# Patient Record
Sex: FEMALE | Race: WHITE | NOT HISPANIC OR LATINO | Employment: OTHER | URBAN - METROPOLITAN AREA
[De-identification: names, ages, dates, MRNs, and addresses within clinical notes are randomized per-mention and may not be internally consistent; named-entity substitution may affect disease eponyms.]

---

## 2017-05-22 ENCOUNTER — TELEPHONE (OUTPATIENT)
Dept: UROLOGY | Facility: CLINIC | Age: 67
End: 2017-05-22

## 2017-05-22 RX ORDER — SULFAMETHOXAZOLE AND TRIMETHOPRIM 800; 160 MG/1; MG/1
1 TABLET ORAL 2 TIMES DAILY
Qty: 14 TABLET | Refills: 0 | Status: SHIPPED | OUTPATIENT
Start: 2017-05-22 | End: 2017-05-29

## 2017-05-22 NOTE — TELEPHONE ENCOUNTER
Pt states will be going out of town an would like to know if  would consider calling in an rx for antibiotics just in case she gets a uti while out of town.  Pt states will not take unless she gets uti and she would like to know if okay to increase elmiron an urbel if she gets a ic flare up. Pt states taking elmiron twice daily an urbel 4 times daily. Please advise./ofelia

## 2017-05-22 NOTE — TELEPHONE ENCOUNTER
Okay to take Elmiron up to TID. Uribel up to QID.     Bactrim sent to pharmacy to take ONLY if UTI occurs while traveling.

## 2017-05-22 NOTE — TELEPHONE ENCOUNTER
----- Message from Lashae Velasquez sent at 5/22/2017 11:23 AM CDT -----  Contact: pt  _  1st Request  _  2nd Request  _  3rd Request        Who: pt    Why: pt is asking for emergency prescription for antibiotics. Has other questions regarding her prescriptions. Please call    What Number to Call Back:[phone]    When to Expect a call back: (Before the end of the day)   -- if the call is after 12:00, the call back will be tomorrow.

## 2017-06-22 ENCOUNTER — TELEPHONE (OUTPATIENT)
Dept: UROLOGY | Facility: CLINIC | Age: 67
End: 2017-06-22

## 2017-06-22 NOTE — TELEPHONE ENCOUNTER
----- Message from Ludivina Alonso sent at 6/22/2017 12:13 PM CDT -----  Contact: self  Pt calling to state that medication of Arturo lopez is not covered thru insurance and cost 1,004. Please call pt with the substitution at [phone].

## 2017-06-27 ENCOUNTER — TELEPHONE (OUTPATIENT)
Dept: UROLOGY | Facility: CLINIC | Age: 67
End: 2017-06-27

## 2017-06-27 NOTE — TELEPHONE ENCOUNTER
Can she contact her insurance provider to see what they cover that is similar to Uribel or Urogesic blue? I can pick from what they cover.

## 2017-06-27 NOTE — TELEPHONE ENCOUNTER
Please contact the Uribel rep that calls on our office to check with them to see if there any alternatives. I don't have any other solutions from my standpoint. The voucher is supposed to cover 40tabs for $20, I am not sure if there is away to work with that either.

## 2017-06-27 NOTE — TELEPHONE ENCOUNTER
I did speak with Ms. Robles and she did ask them to check to see what they did cover- The insurance does not cover anything that can be listed as an alternative. If no suggestions or advise, she states she will go ahead and get the medication.

## 2017-06-27 NOTE — TELEPHONE ENCOUNTER
Spoke with nellie at [facility]- The reason why it is not going through is how the script is written- the coupon will go through if the script is written for 40 pills with refills- the coupon can be used as many time as she wants. We would have to rewrite the rx.

## 2017-06-27 NOTE — TELEPHONE ENCOUNTER
I have tried to PAs for Uribel and called in a coupon to the pharmacy- everything is coming up with a 1K co-pay- Is there anything else we can change this medication to for the patient? Please advise.

## 2017-11-22 ENCOUNTER — OFFICE VISIT (OUTPATIENT)
Dept: UROLOGY | Facility: CLINIC | Age: 67
End: 2017-11-22
Attending: UROLOGY
Payer: MEDICARE

## 2017-11-22 VITALS
HEART RATE: 50 BPM | BODY MASS INDEX: 21.84 KG/M2 | SYSTOLIC BLOOD PRESSURE: 126 MMHG | DIASTOLIC BLOOD PRESSURE: 70 MMHG | WEIGHT: 118.69 LBS | HEIGHT: 62 IN

## 2017-11-22 DIAGNOSIS — R33.9 INCOMPLETE BLADDER EMPTYING: ICD-10-CM

## 2017-11-22 DIAGNOSIS — N30.10 INTERSTITIAL CYSTITIS: Primary | ICD-10-CM

## 2017-11-22 LAB
BILIRUB SERPL-MCNC: NORMAL MG/DL
BLOOD URINE, POC: NORMAL
COLOR, POC UA: YELLOW
GLUCOSE UR QL STRIP: NORMAL
KETONES UR QL STRIP: NORMAL
LEUKOCYTE ESTERASE URINE, POC: NORMAL
NITRITE, POC UA: NORMAL
PH, POC UA: 8
POC RESIDUAL URINE VOLUME: 292 ML (ref 0–100)
PROTEIN, POC: NORMAL
SPECIFIC GRAVITY, POC UA: 1.01
UROBILINOGEN, POC UA: NORMAL

## 2017-11-22 PROCEDURE — 51798 US URINE CAPACITY MEASURE: CPT | Mod: S$GLB,,, | Performed by: UROLOGY

## 2017-11-22 PROCEDURE — 81002 URINALYSIS NONAUTO W/O SCOPE: CPT | Mod: S$GLB,,, | Performed by: UROLOGY

## 2017-11-22 PROCEDURE — 99214 OFFICE O/P EST MOD 30 MIN: CPT | Mod: 25,S$GLB,, | Performed by: UROLOGY

## 2017-11-22 NOTE — PROGRESS NOTES
"  Subjective:       Kim Robles is a 67 y.o. female who is an established pt who was seen for evaluation of IC.      Previous pt of Dr Ramires for over 10 years. Diagnosed with IC. Stable on Elmiron and Urogesic blue/Uribel. Never underwent any procedures or instillations. Doing well on medications.     She reports several UTIs that started around time of a vacation (when holding her urine while traveling). She was given PRN Bactrim for traveling - has not needed it.     She has been doing well since last visit. Insurance issues with coverage of Uribel - doing better now.    PVR (bladder scan) today - 292cc. She does do Crede occasionally and is already double voiding.         The following portions of the patient's history were reviewed and updated as appropriate: allergies, current medications, past family history, past medical history, past social history, past surgical history and problem list.    Review of Systems  Constitutional: no fever or chills  ENT: no nasal congestion or sore throat  Respiratory: no cough or shortness of breath  Cardiovascular: no chest pain or palpitations  Gastrointestinal: no nausea or vomiting, tolerating diet  Genitourinary: as per HPI  Hematologic/Lymphatic: no easy bruising or lymphadenopathy  Musculoskeletal: no arthralgias or myalgias  Skin: no rashes or lesions  Neurological: no seizures or tremors  Behavioral/Psych: no auditory or visual hallucinations       Objective:    Vitals:   /70 (BP Location: Left arm, Patient Position: Sitting, BP Method: Large (Automatic))   Pulse (!) 50   Ht 5' 2" (1.575 m)   Wt 53.8 kg (118 lb 11.5 oz)   BMI 21.71 kg/m²     Physical Exam   General: well developed, well nourished in no acute distress  Head: normocephalic, atraumatic  Neck: supple, trachea midline, no obvious enlargement of thyroid  HEENT: EOMI, mucus membranes moist, sclera anicteric, no hearing impairment  Lungs: symmetric expansion, non-labored " breathing  Cardiovascular: regular rate and rhythm, normal pulses  Abdomen: soft, non tender, non distended, no palpable masses, no hepatosplenomegaly, no hernias, no CVA tenderness  Musculoskeletal: no peripheral edema, normal ROM in bilateral upper and lower extremities  Lymphatics: no cervical or inguinal lymphadenopathy  Skin: no rashes or lesions  Neuro: alert and oriented x 3, no gross deficits  Psych: normal judgment and insight, normal mood/affect and non-anxious  Genitourinary:   patient declined exam      Lab Review   Urine analysis today in clinic shows 5-10 RBCs    No results found for: WBC, HGB, HCT, MCV, PLT  Lab Results   Component Value Date    CREATININE 0.70 02/18/2014    BUN 10 02/18/2014       Imaging  NA       Assessment/Plan:      1. Interstitial cystitis    - Stable on Uribel and Elmiron   - Refills given today for 3 month supply x 1 year     2. ABDON   - Double voiding   - Will continue to monitor      Follow up in 12 months with PVR

## 2018-05-14 ENCOUNTER — OFFICE VISIT (OUTPATIENT)
Dept: UROLOGY | Facility: CLINIC | Age: 68
End: 2018-05-14
Payer: MEDICARE

## 2018-05-14 VITALS
BODY MASS INDEX: 22.31 KG/M2 | RESPIRATION RATE: 14 BRPM | WEIGHT: 121.25 LBS | DIASTOLIC BLOOD PRESSURE: 70 MMHG | HEIGHT: 62 IN | HEART RATE: 68 BPM | SYSTOLIC BLOOD PRESSURE: 122 MMHG

## 2018-05-14 DIAGNOSIS — N30.10 INTERSTITIAL CYSTITIS: ICD-10-CM

## 2018-05-14 DIAGNOSIS — R33.9 INCOMPLETE BLADDER EMPTYING: Primary | ICD-10-CM

## 2018-05-14 PROCEDURE — 99999 PR PBB SHADOW E&M-EST. PATIENT-LVL III: CPT | Mod: PBBFAC,,, | Performed by: UROLOGY

## 2018-05-14 PROCEDURE — 99213 OFFICE O/P EST LOW 20 MIN: CPT | Mod: PBBFAC | Performed by: UROLOGY

## 2018-05-14 PROCEDURE — 99214 OFFICE O/P EST MOD 30 MIN: CPT | Mod: S$PBB,,, | Performed by: UROLOGY

## 2018-05-14 RX ORDER — LEVOTHYROXINE SODIUM 25 UG/1
TABLET ORAL
Refills: 7 | COMMUNITY
Start: 2018-04-27 | End: 2018-06-18

## 2018-05-14 RX ORDER — SULFAMETHOXAZOLE AND TRIMETHOPRIM 800; 160 MG/1; MG/1
1 TABLET ORAL 2 TIMES DAILY
Qty: 14 TABLET | Refills: 1 | Status: SHIPPED | OUTPATIENT
Start: 2018-05-14 | End: 2018-05-21

## 2018-05-14 NOTE — PROGRESS NOTES
"  Subjective:       Kim Robles is a 68 y.o. female who is an established pt who was seen for evaluation of IC.      Previous pt of Dr Ramires for over 10 years. Diagnosed with IC. Stable on Elmiron and Urogesic blue/Uribel. Never underwent any procedures or instillations. Doing well on medications.     She reports several UTIs that started around time of a vacation (when holding her urine while traveling). She was given PRN Bactrim for traveling - has not needed it.     Insurance issues with coverage of Uribel - doing better now.    PVR (bladder scan) last visit - 292cc. She does do Crede occasionally and is already double voiding.     She now returns with more urgency and sensation of ABDON. She took Bactrim in 4/18 due to concern for UTI (vs flare) around time of vacation (holds urine). She is noting more dietary and stress triggers. More constipation recently. Nocturia x 3.    PVR (bladder scan) today - >400cc      The following portions of the patient's history were reviewed and updated as appropriate: allergies, current medications, past family history, past medical history, past social history, past surgical history and problem list.    Review of Systems  Constitutional: no fever or chills  ENT: no nasal congestion or sore throat  Respiratory: no cough or shortness of breath  Cardiovascular: no chest pain or palpitations  Gastrointestinal: no nausea or vomiting, tolerating diet  Genitourinary: as per HPI  Hematologic/Lymphatic: no easy bruising or lymphadenopathy  Musculoskeletal: no arthralgias or myalgias  Skin: no rashes or lesions  Neurological: no seizures or tremors  Behavioral/Psych: no auditory or visual hallucinations       Objective:    Vitals:   /70   Pulse 68   Resp 14   Ht 5' 2" (1.575 m)   Wt 55 kg (121 lb 4.1 oz)   BMI 22.18 kg/m²     Physical Exam   General: well developed, well nourished in no acute distress  Head: normocephalic, atraumatic  Neck: supple, trachea midline, no " obvious enlargement of thyroid  HEENT: EOMI, mucus membranes moist, sclera anicteric, no hearing impairment  Lungs: symmetric expansion, non-labored breathing  Cardiovascular: regular rate and rhythm, normal pulses  Abdomen: soft, non tender, non distended, no palpable masses, no hepatosplenomegaly, no hernias, no CVA tenderness  Musculoskeletal: no peripheral edema, normal ROM in bilateral upper and lower extremities  Lymphatics: no cervical or inguinal lymphadenopathy  Skin: no rashes or lesions  Neuro: alert and oriented x 3, no gross deficits  Psych: normal judgment and insight, normal mood/affect and non-anxious  Genitourinary:   patient declined exam      Lab Review   Urine analysis today in clinic shows - negative    No results found for: WBC, HGB, HCT, MCV, PLT  Lab Results   Component Value Date    CREATININE 0.70 02/18/2014    BUN 10 02/18/2014       Imaging  NA       Assessment/Plan:      1. Interstitial cystitis    - Stable on Uribel and Elmiron       2. ABDON   - Double voiding   - PVR even higher now   - ?Flomax ?Urecholine   - Miralax now for constipation   - Urodynamics/cystoscopy   - ?CIC - okay to hold   - ?InterStim    3. Recurrent UTI   - Bactrim rx for self-start PRN   - UCx with UTI symptoms      Follow up in 2 months with PVR

## 2018-06-28 ENCOUNTER — TELEPHONE (OUTPATIENT)
Dept: UROLOGY | Facility: CLINIC | Age: 68
End: 2018-06-28

## 2018-08-27 ENCOUNTER — OFFICE VISIT (OUTPATIENT)
Dept: UROLOGY | Facility: CLINIC | Age: 68
End: 2018-08-27
Payer: MEDICARE

## 2018-08-27 VITALS
HEART RATE: 68 BPM | BODY MASS INDEX: 22.36 KG/M2 | SYSTOLIC BLOOD PRESSURE: 112 MMHG | WEIGHT: 121.5 LBS | HEIGHT: 62 IN | DIASTOLIC BLOOD PRESSURE: 70 MMHG | RESPIRATION RATE: 14 BRPM

## 2018-08-27 DIAGNOSIS — R33.9 INCOMPLETE BLADDER EMPTYING: Primary | ICD-10-CM

## 2018-08-27 DIAGNOSIS — N39.0 RECURRENT UTI: ICD-10-CM

## 2018-08-27 DIAGNOSIS — N30.10 INTERSTITIAL CYSTITIS: ICD-10-CM

## 2018-08-27 PROCEDURE — 99213 OFFICE O/P EST LOW 20 MIN: CPT | Mod: PBBFAC | Performed by: UROLOGY

## 2018-08-27 PROCEDURE — 99999 PR PBB SHADOW E&M-EST. PATIENT-LVL III: CPT | Mod: PBBFAC,,, | Performed by: UROLOGY

## 2018-08-27 PROCEDURE — 99214 OFFICE O/P EST MOD 30 MIN: CPT | Mod: S$PBB,,, | Performed by: UROLOGY

## 2018-08-27 RX ORDER — TETANUS TOXOID, REDUCED DIPHTHERIA TOXOID AND ACELLULAR PERTUSSIS VACCINE, ADSORBED 5; 2.5; 8; 8; 2.5 [IU]/.5ML; [IU]/.5ML; UG/.5ML; UG/.5ML; UG/.5ML
SUSPENSION INTRAMUSCULAR
COMMUNITY
Start: 2018-07-20

## 2018-08-27 NOTE — PROGRESS NOTES
"  Subjective:       Kim Robles is a 68 y.o. female who is an established pt who was seen for evaluation of IC.      Previous pt of Dr Ramires for over 10 years. Diagnosed with IC. Stable on Elmiron and Urogesic blue/Uribel. Never underwent any procedures or instillations. Doing well on medications.     She reports several UTIs that started around time of a vacation (when holding her urine while traveling). She was given PRN Bactrim for traveling - has not needed it.     Insurance issues with coverage of Uribel - doing better now.    PVR (bladder scan) last visit - 292cc. She does do Crede occasionally and is already double voiding.     She returned with more urgency and sensation of ABDON. She took Bactrim in 4/18 due to concern for UTI (vs flare) around time of vacation (holds urine). She is noting more dietary and stress triggers. More constipation recently - improved with Miralax. Nocturia x 3.    She is feeling better than previously.     PVR (bladder scan) last visit - >400cc, today - 252cc      The following portions of the patient's history were reviewed and updated as appropriate: allergies, current medications, past family history, past medical history, past social history, past surgical history and problem list.    Review of Systems  Constitutional: no fever or chills  ENT: no nasal congestion or sore throat  Respiratory: no cough or shortness of breath  Cardiovascular: no chest pain or palpitations  Gastrointestinal: no nausea or vomiting, tolerating diet  Genitourinary: as per HPI  Hematologic/Lymphatic: no easy bruising or lymphadenopathy  Musculoskeletal: no arthralgias or myalgias  Skin: no rashes or lesions  Neurological: no seizures or tremors  Behavioral/Psych: no auditory or visual hallucinations       Objective:    Vitals:   /70   Pulse 68   Resp 14   Ht 5' 2" (1.575 m)   Wt 55.1 kg (121 lb 7.6 oz)   BMI 22.22 kg/m²     Physical Exam   General: well developed, well nourished in " no acute distress  Head: normocephalic, atraumatic  Neck: supple, trachea midline, no obvious enlargement of thyroid  HEENT: EOMI, mucus membranes moist, sclera anicteric, no hearing impairment  Lungs: symmetric expansion, non-labored breathing  Cardiovascular: regular rate and rhythm, normal pulses  Abdomen: soft, non tender, non distended, no palpable masses, no hepatosplenomegaly, no hernias, no CVA tenderness  Musculoskeletal: no peripheral edema, normal ROM in bilateral upper and lower extremities  Lymphatics: no cervical or inguinal lymphadenopathy  Skin: no rashes or lesions  Neuro: alert and oriented x 3, no gross deficits  Psych: normal judgment and insight, normal mood/affect and non-anxious  Genitourinary:   patient declined exam      Lab Review   Urine analysis today in clinic shows - negative    No results found for: WBC, HGB, HCT, MCV, PLT  Lab Results   Component Value Date    CREATININE 0.70 02/18/2014    BUN 10 02/18/2014       Imaging  NA       Assessment/Plan:      1. Interstitial cystitis    - Stable on Uribel and Elmiron       2. ABDON   - Double voiding   - PVR even higher now   - ?Flomax ?Urecholine   - Miralax now for constipation   - Urodynamics/cystoscopy   - ?CIC - okay to hold   - ?InterStim   - Improving    3. Recurrent UTI   - Bactrim rx for self-start PRN   - UCx with UTI symptoms   - No issues currently      Follow up in 6 months with PVR

## 2018-12-11 RX ORDER — PENTOSAN POLYSULFATE SODIUM 100 MG/1
CAPSULE, GELATIN COATED ORAL
Qty: 180 CAPSULE | Refills: 3 | Status: SHIPPED | OUTPATIENT
Start: 2018-12-11 | End: 2019-11-14 | Stop reason: SDUPTHER

## 2019-02-28 ENCOUNTER — OFFICE VISIT (OUTPATIENT)
Dept: UROLOGY | Facility: CLINIC | Age: 69
End: 2019-02-28
Payer: MEDICARE

## 2019-02-28 VITALS
HEIGHT: 62 IN | SYSTOLIC BLOOD PRESSURE: 112 MMHG | WEIGHT: 124.31 LBS | RESPIRATION RATE: 14 BRPM | DIASTOLIC BLOOD PRESSURE: 58 MMHG | BODY MASS INDEX: 22.88 KG/M2 | HEART RATE: 60 BPM

## 2019-02-28 DIAGNOSIS — R33.9 INCOMPLETE BLADDER EMPTYING: ICD-10-CM

## 2019-02-28 DIAGNOSIS — N39.0 RECURRENT UTI: ICD-10-CM

## 2019-02-28 DIAGNOSIS — N30.10 INTERSTITIAL CYSTITIS: Primary | ICD-10-CM

## 2019-02-28 PROCEDURE — 99999 PR PBB SHADOW E&M-EST. PATIENT-LVL III: CPT | Mod: PBBFAC,,, | Performed by: UROLOGY

## 2019-02-28 PROCEDURE — 99213 OFFICE O/P EST LOW 20 MIN: CPT | Mod: PBBFAC | Performed by: UROLOGY

## 2019-02-28 PROCEDURE — 99214 OFFICE O/P EST MOD 30 MIN: CPT | Mod: S$PBB,,, | Performed by: UROLOGY

## 2019-02-28 PROCEDURE — 99214 PR OFFICE/OUTPT VISIT, EST, LEVL IV, 30-39 MIN: ICD-10-PCS | Mod: S$PBB,,, | Performed by: UROLOGY

## 2019-02-28 PROCEDURE — 99999 PR PBB SHADOW E&M-EST. PATIENT-LVL III: ICD-10-PCS | Mod: PBBFAC,,, | Performed by: UROLOGY

## 2019-02-28 RX ORDER — OMEGA-3 FATTY ACIDS 1000 MG
2 CAPSULE ORAL
COMMUNITY

## 2019-02-28 RX ORDER — URINARY ANTISEPTIC ANTISPASMODIC 81.6; 40.8; 10.8; .12 MG/1; MG/1; MG/1; MG/1
1 TABLET ORAL
COMMUNITY
End: 2019-02-28 | Stop reason: CLARIF

## 2019-02-28 NOTE — PROGRESS NOTES
"  Subjective:       iKm Robles is a 68 y.o. female who is an established pt who was seen for evaluation of IC.      Previous pt of Dr Ramires for over 10 years. Diagnosed with IC. Stable on Elmiron and Urogesic blue/Uribel. Never underwent any procedures or instillations. Doing well on medications.     She reports several UTIs that started around time of a vacation (when holding her urine while traveling). She was given PRN Bactrim for traveling - has not needed it.     Insurance issues with coverage of Uribel - doing better now.    PVR (bladder scan) last visit - 292cc. She does do Crede occasionally and is already double voiding.     She returned with more urgency and sensation of ABDON. She took Bactrim in 4/18 due to concern for UTI (vs flare) around time of vacation (holds urine). She is noting more dietary and stress triggers. More constipation recently - improved with Miralax. Nocturia x 3.    She is feeling better than previously.     PVR (bladder scan) last visit - >400cc, 252cc, today - 221cc      The following portions of the patient's history were reviewed and updated as appropriate: allergies, current medications, past family history, past medical history, past social history, past surgical history and problem list.    Review of Systems  Constitutional: no fever or chills  ENT: no nasal congestion or sore throat  Respiratory: no cough or shortness of breath  Cardiovascular: no chest pain or palpitations  Gastrointestinal: no nausea or vomiting, tolerating diet  Genitourinary: as per HPI  Hematologic/Lymphatic: no easy bruising or lymphadenopathy  Musculoskeletal: no arthralgias or myalgias  Skin: no rashes or lesions  Neurological: no seizures or tremors  Behavioral/Psych: no auditory or visual hallucinations       Objective:    Vitals:   BP (!) 112/58   Pulse 60   Resp 14   Ht 5' 2" (1.575 m)   Wt 56.4 kg (124 lb 5.4 oz)   BMI 22.74 kg/m²     Physical Exam   General: well developed, well " nourished in no acute distress  Head: normocephalic, atraumatic  Neck: supple, trachea midline, no obvious enlargement of thyroid  HEENT: EOMI, mucus membranes moist, sclera anicteric, no hearing impairment  Lungs: symmetric expansion, non-labored breathing  Cardiovascular: regular rate and rhythm, normal pulses  Abdomen: soft, non tender, non distended, no palpable masses, no hepatosplenomegaly, no hernias, no CVA tenderness  Musculoskeletal: no peripheral edema, normal ROM in bilateral upper and lower extremities  Lymphatics: no cervical or inguinal lymphadenopathy  Skin: no rashes or lesions  Neuro: alert and oriented x 3, no gross deficits  Psych: normal judgment and insight, normal mood/affect and non-anxious  Genitourinary:   patient declined exam      Lab Review   Urine analysis today in clinic shows - negative    No results found for: WBC, HGB, HCT, MCV, PLT  Lab Results   Component Value Date    CREATININE 0.70 02/18/2014    BUN 10 02/18/2014       Imaging  NA       Assessment/Plan:      1. Interstitial cystitis    - Stable on Uribel and Elmiron       2. ABDON   - Double voiding   - PVR even higher now   - ?Flomax ?Urecholine   - Miralax now for constipation - doing well    - Urodynamics/cystoscopy   - ?CIC - okay to hold   - ?InterStim   - Improving    3. Recurrent UTI   - Bactrim rx for self-start PRN   - UCx with UTI symptoms   - No issues currently      Follow up in 12 months with PVR

## 2019-07-05 RX ORDER — METHENAMINE, SODIUM PHOSPHATE, MONOBASIC, MONOHYDRATE, PHENYL SALICYLATE, METHYLENE BLUE, AND HYOSCYAMINE SULFATE 118; 40.8; 36; 10; .12 MG/1; MG/1; MG/1; MG/1; MG/1
CAPSULE ORAL
Qty: 40 CAPSULE | Refills: 99 | Status: SHIPPED | OUTPATIENT
Start: 2019-07-05 | End: 2019-07-09 | Stop reason: SDUPTHER

## 2019-11-13 ENCOUNTER — TELEPHONE (OUTPATIENT)
Dept: UROLOGY | Facility: CLINIC | Age: 69
End: 2019-11-13

## 2019-11-13 NOTE — TELEPHONE ENCOUNTER
I am not sure how a physician can be out of network with a pharmacy. Do they need a new rx sent to [facility]? I don't quite understand the issue.

## 2019-11-13 NOTE — TELEPHONE ENCOUNTER
Pt.  is calling in reference to Uribel . Recently changed Pharmacy from [facility] to [facility] and was told that Dr. Cutler was out of network with them and was told they could not get the medication refilled ..  states he has never had an issue at [facility] with this before .. YUDI.

## 2019-11-14 ENCOUNTER — TELEPHONE (OUTPATIENT)
Dept: UROLOGY | Facility: CLINIC | Age: 69
End: 2019-11-14

## 2019-11-14 NOTE — TELEPHONE ENCOUNTER
----- Message from Eboni Ha MA sent at 11/14/2019  2:24 PM CST -----  Contact: Kim [phone]      ----- Message -----  From: Kimmy Marroquin  Sent: 11/14/2019  11:56 AM CST  To: [facility]    Type: RX Refill Request    Who Called: Kim     Refill or New Rx: refill     RX Name and Strength: ELMIRON 100 mg Cap    Is this a 30 day or 90 day RX:30 day     Preferred Pharmacy with phone number: [facility], [address]. [phone]    Would the patient rather a call back or a response via My [email or web address]? Call back     Best Call Back Number:[phone]    Additional Information: (#The patient stated that whe she tried to get the medication refilled, she was told that the medication is not covered under Medicare Part D. Patient stated that she did not understand & would like the staff to fax the medication over as soon as possible#)

## 2019-11-14 NOTE — TELEPHONE ENCOUNTER
----- Message from Kimmy Marroquin sent at 11/14/2019 11:56 AM CST -----  Contact: Kim [phone]  Type: RX Refill Request    Who Called: Kim     Refill or New Rx: refill     RX Name and Strength: ELMIRON 100 mg Cap    Is this a 30 day or 90 day RX:30 day     Preferred Pharmacy with phone number: [facility], [address]. [phone]    Would the patient rather a call back or a response via My [employer]? Call back     Best Call Back Number:[phone]    Additional Information: (#The patient stated that whe she tried to get the medication refilled, she was told that the medication is not covered under Medicare Part D. Patient stated that she did not understand & would like the staff to fax the medication over as soon as possible#)

## 2019-11-14 NOTE — TELEPHONE ENCOUNTER
Spoke to pt advised rx for elmiron was sent to [facility]. Pt states she fully understands.-ofelia

## 2020-07-16 ENCOUNTER — OFFICE VISIT (OUTPATIENT)
Dept: UROLOGY | Facility: CLINIC | Age: 70
End: 2020-07-16
Payer: MEDICARE

## 2020-07-16 VITALS — BODY MASS INDEX: 22.63 KG/M2 | HEIGHT: 62 IN | WEIGHT: 123 LBS

## 2020-07-16 DIAGNOSIS — N30.10 INTERSTITIAL CYSTITIS: Primary | ICD-10-CM

## 2020-07-16 DIAGNOSIS — R33.9 INCOMPLETE BLADDER EMPTYING: ICD-10-CM

## 2020-07-16 DIAGNOSIS — N39.0 RECURRENT UTI: ICD-10-CM

## 2020-07-16 PROCEDURE — 99999 PR PBB SHADOW E&M-EST. PATIENT-LVL IV: ICD-10-PCS | Mod: PBBFAC,,, | Performed by: UROLOGY

## 2020-07-16 PROCEDURE — 99214 OFFICE O/P EST MOD 30 MIN: CPT | Mod: PBBFAC | Performed by: UROLOGY

## 2020-07-16 PROCEDURE — 99213 OFFICE O/P EST LOW 20 MIN: CPT | Mod: S$PBB,,, | Performed by: UROLOGY

## 2020-07-16 PROCEDURE — 99213 PR OFFICE/OUTPT VISIT, EST, LEVL III, 20-29 MIN: ICD-10-PCS | Mod: S$PBB,,, | Performed by: UROLOGY

## 2020-07-16 PROCEDURE — 99999 PR PBB SHADOW E&M-EST. PATIENT-LVL IV: CPT | Mod: PBBFAC,,, | Performed by: UROLOGY

## 2020-07-16 RX ORDER — METHENAMINE, SODIUM PHOSPHATE, MONOBASIC, MONOHYDRATE, PHENYL SALICYLATE, METHYLENE BLUE, AND HYOSCYAMINE SULFATE 118; 40.8; 36; 10; .12 MG/1; MG/1; MG/1; MG/1; MG/1
1 CAPSULE ORAL 3 TIMES DAILY PRN
Qty: 90 CAPSULE | Refills: 99 | Status: SHIPPED | OUTPATIENT
Start: 2020-07-16 | End: 2020-07-27

## 2020-07-16 RX ORDER — PENTOSAN POLYSULFATE SODIUM 100 MG/1
100 CAPSULE, GELATIN COATED ORAL 2 TIMES DAILY
Qty: 180 CAPSULE | Refills: 3 | Status: SHIPPED | OUTPATIENT
Start: 2020-07-16 | End: 2021-08-04

## 2020-07-16 RX ORDER — CELECOXIB 200 MG/1
200 CAPSULE ORAL
COMMUNITY
End: 2024-02-26

## 2020-07-16 NOTE — PROGRESS NOTES
Subjective:       Kim Robles is a 70 y.o. female who is an established pt who was seen for evaluation of IC.      Previous pt of Dr Ramires for over 10 years. Diagnosed with IC. Stable on Elmiron and Urogesic blue/Uribel. Never underwent any procedures or instillations. Doing well on medications.     She reports several UTIs that started around time of a vacation (when holding her urine while traveling). She was given PRN Bactrim for traveling - has not needed it.     Insurance issues with coverage of Uribel - doing better now.    PVR (bladder scan) last visit - 292cc. She does do Crede occasionally and is already double voiding.     She returned with more urgency and sensation of ABDON. She took Bactrim in 4/18 due to concern for UTI (vs flare) around time of vacation (holds urine). She is noting more dietary and stress triggers. More constipation recently - improved with Miralax. Nocturia x 3.    7/16/2020  Annual visit - doing well. PVR more elevated but not symptomatic. She has adjusted Uribel rx - getting 90 tabs (22 days). Tried to take it TID rather than QID. Taking OTC cranberry.  PVR more elevated. Linzess helping with bowels.     PVR (bladder scan) last visit - >400cc, 252cc, 221cc. Today - 514cc      The following portions of the patient's history were reviewed and updated as appropriate: allergies, current medications, past family history, past medical history, past social history, past surgical history and problem list.    Review of Systems  Constitutional: no fever or chills  ENT: no nasal congestion or sore throat  Respiratory: no cough or shortness of breath  Cardiovascular: no chest pain or palpitations  Gastrointestinal: no nausea or vomiting, tolerating diet  Genitourinary: as per HPI  Hematologic/Lymphatic: no easy bruising or lymphadenopathy  Musculoskeletal: no arthralgias or myalgias  Skin: no rashes or lesions  Neurological: no seizures or tremors  Behavioral/Psych: no auditory or  "visual hallucinations       Objective:    Vitals:   Ht 5' 2" (1.575 m)   Wt 55.8 kg (123 lb 0.3 oz)   BMI 22.50 kg/m²     Physical Exam   General: well developed, well nourished in no acute distress  Head: normocephalic, atraumatic  Neck: supple, trachea midline, no obvious enlargement of thyroid  HEENT: EOMI, mucus membranes moist, sclera anicteric, no hearing impairment  Lungs: symmetric expansion, non-labored breathing  Cardiovascular: regular rate and rhythm, normal pulses  Abdomen: soft, non tender, non distended, no palpable masses, no hepatosplenomegaly, no hernias, no CVA tenderness  Musculoskeletal: no peripheral edema, normal ROM in bilateral upper and lower extremities  Lymphatics: no cervical or inguinal lymphadenopathy  Skin: no rashes or lesions  Neuro: alert and oriented x 3, no gross deficits  Psych: normal judgment and insight, normal mood/affect and non-anxious  Genitourinary:   patient declined exam      Lab Review   Urine analysis today in clinic shows - negative    No results found for: WBC, HGB, HCT, MCV, PLT  Lab Results   Component Value Date    CREATININE 0.70 02/18/2014    BUN 10 02/18/2014     Imaging  NA       Assessment/Plan:      1. Interstitial cystitis    - Stable on Uribel and Elmiron   - Recommend f/u with eye doctor 2/2 Elmiron        2. ABDON   - Double voiding   - PVR even higher now   - ?Flomax ?Urecholine   - Miralax now for constipation - doing well    - Urodynamics/cystoscopy   - ?CIC - okay to hold   - ?InterStim   - Worsened today - asymptomatic    3. Recurrent UTI   - Bactrim rx for self-start PRN   - UCx with UTI symptoms   - No issues currently      Follow up in 12 months with PVR         "

## 2021-04-12 ENCOUNTER — PATIENT MESSAGE (OUTPATIENT)
Dept: RESEARCH | Facility: HOSPITAL | Age: 71
End: 2021-04-12

## 2021-07-16 ENCOUNTER — TELEPHONE (OUTPATIENT)
Dept: UROLOGY | Facility: CLINIC | Age: 71
End: 2021-07-16

## 2021-10-19 ENCOUNTER — OFFICE VISIT (OUTPATIENT)
Dept: UROLOGY | Facility: CLINIC | Age: 71
End: 2021-10-19
Payer: MEDICARE

## 2021-10-19 VITALS — HEIGHT: 62 IN | WEIGHT: 122.25 LBS | BODY MASS INDEX: 22.5 KG/M2

## 2021-10-19 DIAGNOSIS — R33.9 INCOMPLETE BLADDER EMPTYING: ICD-10-CM

## 2021-10-19 DIAGNOSIS — N30.10 INTERSTITIAL CYSTITIS: Primary | ICD-10-CM

## 2021-10-19 DIAGNOSIS — N39.0 RECURRENT UTI: ICD-10-CM

## 2021-10-19 PROCEDURE — 99999 PR PBB SHADOW E&M-EST. PATIENT-LVL IV: ICD-10-PCS | Mod: PBBFAC,,, | Performed by: UROLOGY

## 2021-10-19 PROCEDURE — 99213 OFFICE O/P EST LOW 20 MIN: CPT | Mod: S$PBB,,, | Performed by: UROLOGY

## 2021-10-19 PROCEDURE — 99999 PR PBB SHADOW E&M-EST. PATIENT-LVL IV: CPT | Mod: PBBFAC,,, | Performed by: UROLOGY

## 2021-10-19 PROCEDURE — 99213 PR OFFICE/OUTPT VISIT, EST, LEVL III, 20-29 MIN: ICD-10-PCS | Mod: S$PBB,,, | Performed by: UROLOGY

## 2021-10-19 PROCEDURE — 99214 OFFICE O/P EST MOD 30 MIN: CPT | Mod: PBBFAC | Performed by: UROLOGY

## 2022-07-19 ENCOUNTER — PATIENT MESSAGE (OUTPATIENT)
Dept: RESEARCH | Facility: CLINIC | Age: 72
End: 2022-07-19
Payer: MEDICARE

## 2022-07-20 ENCOUNTER — OFFICE VISIT (OUTPATIENT)
Dept: NEUROLOGY | Facility: HOSPITAL | Age: 72
End: 2022-07-20
Attending: INTERNAL MEDICINE
Payer: MEDICARE

## 2022-07-20 ENCOUNTER — LAB VISIT (OUTPATIENT)
Dept: LAB | Facility: HOSPITAL | Age: 72
End: 2022-07-20
Attending: INTERNAL MEDICINE
Payer: MEDICARE

## 2022-07-20 VITALS
HEIGHT: 62 IN | HEART RATE: 62 BPM | SYSTOLIC BLOOD PRESSURE: 140 MMHG | WEIGHT: 122.38 LBS | BODY MASS INDEX: 22.52 KG/M2 | DIASTOLIC BLOOD PRESSURE: 68 MMHG

## 2022-07-20 DIAGNOSIS — D50.0 IRON DEFICIENCY ANEMIA DUE TO CHRONIC BLOOD LOSS: ICD-10-CM

## 2022-07-20 DIAGNOSIS — D50.0 IRON DEFICIENCY ANEMIA DUE TO CHRONIC BLOOD LOSS: Primary | ICD-10-CM

## 2022-07-20 LAB
ALBUMIN SERPL BCP-MCNC: 3.6 G/DL (ref 3.5–5.2)
ALP SERPL-CCNC: 53 U/L (ref 55–135)
ALT SERPL W/O P-5'-P-CCNC: 15 U/L (ref 10–44)
ANION GAP SERPL CALC-SCNC: 8 MMOL/L (ref 8–16)
AST SERPL-CCNC: 25 U/L (ref 10–40)
BASOPHILS # BLD AUTO: 0.05 K/UL (ref 0–0.2)
BASOPHILS NFR BLD: 0.8 % (ref 0–1.9)
BILIRUB SERPL-MCNC: 0.4 MG/DL (ref 0.1–1)
BUN SERPL-MCNC: 8 MG/DL (ref 8–23)
CALCIUM SERPL-MCNC: 10.1 MG/DL (ref 8.7–10.5)
CHLORIDE SERPL-SCNC: 102 MMOL/L (ref 95–110)
CO2 SERPL-SCNC: 26 MMOL/L (ref 23–29)
CREAT SERPL-MCNC: 0.7 MG/DL (ref 0.5–1.4)
DIFFERENTIAL METHOD: ABNORMAL
EOSINOPHIL # BLD AUTO: 0 K/UL (ref 0–0.5)
EOSINOPHIL NFR BLD: 0.5 % (ref 0–8)
ERYTHROCYTE [DISTWIDTH] IN BLOOD BY AUTOMATED COUNT: 12.9 % (ref 11.5–14.5)
EST. GFR  (AFRICAN AMERICAN): >60 ML/MIN/1.73 M^2
EST. GFR  (NON AFRICAN AMERICAN): >60 ML/MIN/1.73 M^2
GLUCOSE SERPL-MCNC: 96 MG/DL (ref 70–110)
HCT VFR BLD AUTO: 36.8 % (ref 37–48.5)
HGB BLD-MCNC: 12.1 G/DL (ref 12–16)
IMM GRANULOCYTES # BLD AUTO: 0.02 K/UL (ref 0–0.04)
IMM GRANULOCYTES NFR BLD AUTO: 0.3 % (ref 0–0.5)
LYMPHOCYTES # BLD AUTO: 1.2 K/UL (ref 1–4.8)
LYMPHOCYTES NFR BLD: 19.4 % (ref 18–48)
MCH RBC QN AUTO: 29.6 PG (ref 27–31)
MCHC RBC AUTO-ENTMCNC: 32.9 G/DL (ref 32–36)
MCV RBC AUTO: 90 FL (ref 82–98)
MONOCYTES # BLD AUTO: 0.5 K/UL (ref 0.3–1)
MONOCYTES NFR BLD: 8.9 % (ref 4–15)
NEUTROPHILS # BLD AUTO: 4.3 K/UL (ref 1.8–7.7)
NEUTROPHILS NFR BLD: 70.1 % (ref 38–73)
NRBC BLD-RTO: 0 /100 WBC
PLATELET # BLD AUTO: 278 K/UL (ref 150–450)
PMV BLD AUTO: 9.8 FL (ref 9.2–12.9)
POTASSIUM SERPL-SCNC: 4.6 MMOL/L (ref 3.5–5.1)
PROT SERPL-MCNC: 6.4 G/DL (ref 6–8.4)
RBC # BLD AUTO: 4.09 M/UL (ref 4–5.4)
SODIUM SERPL-SCNC: 136 MMOL/L (ref 136–145)
WBC # BLD AUTO: 6.08 K/UL (ref 3.9–12.7)

## 2022-07-20 PROCEDURE — 85025 COMPLETE CBC W/AUTO DIFF WBC: CPT | Performed by: INTERNAL MEDICINE

## 2022-07-20 PROCEDURE — 99215 OFFICE O/P EST HI 40 MIN: CPT | Performed by: INTERNAL MEDICINE

## 2022-07-20 PROCEDURE — 36415 COLL VENOUS BLD VENIPUNCTURE: CPT | Performed by: INTERNAL MEDICINE

## 2022-07-20 PROCEDURE — 80053 COMPREHEN METABOLIC PANEL: CPT | Performed by: INTERNAL MEDICINE

## 2022-07-20 RX ORDER — LEVOCETIRIZINE DIHYDROCHLORIDE 5 MG/1
5 TABLET, FILM COATED ORAL DAILY
COMMUNITY
Start: 2022-07-16

## 2022-07-20 NOTE — PROGRESS NOTES
"[facility]    CC: anemia     HPI 72 y.o. female with a history of tension H/A, HTN, IC presents as a referral from Dr. Franklin for a 1 year history of SHYANN associated with small bowel ulceration on VCE. She presents today for evaluation for DBE to evaluate the observed ulceration on DBE as well as for removal of the retained VCE.     Past Medical History  Past Medical History:   Diagnosis Date    Hypercholesteremia     Hypertension     IC (interstitial cystitis)     Osteopenia          Review of Systems  General ROS: negative for chills, fever or weight loss  Cardiovascular ROS: no chest pain or dyspnea on exertion  Gastrointestinal ROS: no abdominal pain, change in bowel habits, or black/ bloody stools    Physical Examination  BP (!) 140/68   Pulse 62   Ht 5' 2" (1.575 m)   Wt 55.5 kg (122 lb 5.7 oz)   BMI 22.38 kg/m²   General appearance: alert, cooperative, no distress  HENT: Normocephalic, atraumatic, neck symmetrical, no nasal discharge   Lungs: clear to auscultation bilaterally, no dullness to percussion bilaterally  Heart: regular rate and rhythm without rub; no displacement of the PMI   Abdomen: soft, non-tender; bowel sounds normoactive; no organomegaly  Extremities: extremities symmetric; no clubbing, cyanosis, or edema  Neurologic: Alert and oriented X 3, normal strength, normal coordination and gait    Labs:  Lab Results   Component Value Date    WBC 6.08 07/20/2022    HGB 12.1 07/20/2022    HCT 36.8 (L) 07/20/2022    MCV 90 07/20/2022     07/20/2022     VCE images reviewed which show ulceration in the likely proximal-mid ileum     Assessment:   Upper DBE tomorrow for biopsy of the ulcerated area to evaluate for NSAID enteropathy due to NSAIDs in the past for tension H/A and current therapy for cystitis which includes salicylate therapy       Richard Parish MD   [address], [address]   [place] [address] [phone]      "

## 2022-07-20 NOTE — PATIENT INSTRUCTIONS
Labs today, 1st floor MOB, Patient Diagnostics.    You are scheduled for an Upper DBE on ____Thursday, July 21, 2022 at [facility] at [address] [place] [address] _____________________________    You should eat light meals the day before the procedure and nothing to eat or drink after midnight the night before your procedure.    You will need to be at the 1st floor admission desk at the hospital on ________________________

## 2022-07-21 ENCOUNTER — ANESTHESIA EVENT (OUTPATIENT)
Dept: ENDOSCOPY | Facility: HOSPITAL | Age: 72
End: 2022-07-21
Payer: MEDICARE

## 2022-07-21 ENCOUNTER — HOSPITAL ENCOUNTER (OUTPATIENT)
Facility: HOSPITAL | Age: 72
Discharge: HOME | End: 2022-07-21
Attending: INTERNAL MEDICINE | Admitting: INTERNAL MEDICINE
Payer: MEDICARE

## 2022-07-21 ENCOUNTER — ANESTHESIA (OUTPATIENT)
Dept: ENDOSCOPY | Facility: HOSPITAL | Age: 72
End: 2022-07-21
Payer: MEDICARE

## 2022-07-21 VITALS
TEMPERATURE: 98 F | HEART RATE: 76 BPM | BODY MASS INDEX: 22.45 KG/M2 | HEIGHT: 62 IN | DIASTOLIC BLOOD PRESSURE: 78 MMHG | SYSTOLIC BLOOD PRESSURE: 134 MMHG | OXYGEN SATURATION: 99 % | WEIGHT: 122 LBS | RESPIRATION RATE: 17 BRPM

## 2022-07-21 DIAGNOSIS — D50.9 IRON (FE) DEFICIENCY ANEMIA: ICD-10-CM

## 2022-07-21 DIAGNOSIS — D50.0 ANEMIA DUE TO CHRONIC BLOOD LOSS: ICD-10-CM

## 2022-07-21 PROCEDURE — 88342 CHG IMMUNOCYTOCHEMISTRY: ICD-10-PCS | Mod: 26,,, | Performed by: PATHOLOGY

## 2022-07-21 PROCEDURE — 88305 TISSUE EXAM BY PATHOLOGIST: CPT | Performed by: PATHOLOGY

## 2022-07-21 PROCEDURE — 88305 TISSUE EXAM BY PATHOLOGIST: ICD-10-PCS | Mod: 26,,, | Performed by: PATHOLOGY

## 2022-07-21 PROCEDURE — 25000003 PHARM REV CODE 250: Performed by: INTERNAL MEDICINE

## 2022-07-21 PROCEDURE — 88341 IMHCHEM/IMCYTCHM EA ADD ANTB: CPT | Performed by: PATHOLOGY

## 2022-07-21 PROCEDURE — 88341 IMHCHEM/IMCYTCHM EA ADD ANTB: CPT | Mod: 26,,, | Performed by: PATHOLOGY

## 2022-07-21 PROCEDURE — 88305 TISSUE EXAM BY PATHOLOGIST: CPT | Mod: 26,,, | Performed by: PATHOLOGY

## 2022-07-21 PROCEDURE — 27201012 HC FORCEPS, HOT/COLD, DISP: Performed by: INTERNAL MEDICINE

## 2022-07-21 PROCEDURE — 44361 SMALL BOWEL ENDOSCOPY/BIOPSY: CPT | Performed by: INTERNAL MEDICINE

## 2022-07-21 PROCEDURE — 37000009 HC ANESTHESIA EA ADD 15 MINS: Performed by: INTERNAL MEDICINE

## 2022-07-21 PROCEDURE — 88342 IMHCHEM/IMCYTCHM 1ST ANTB: CPT | Performed by: PATHOLOGY

## 2022-07-21 PROCEDURE — 25000003 PHARM REV CODE 250: Performed by: NURSE ANESTHETIST, CERTIFIED REGISTERED

## 2022-07-21 PROCEDURE — 63600175 PHARM REV CODE 636 W HCPCS: Performed by: NURSE ANESTHETIST, CERTIFIED REGISTERED

## 2022-07-21 PROCEDURE — 88342 IMHCHEM/IMCYTCHM 1ST ANTB: CPT | Mod: 26,,, | Performed by: PATHOLOGY

## 2022-07-21 PROCEDURE — 27201028 HC NEEDLE, SCLERO: Performed by: INTERNAL MEDICINE

## 2022-07-21 PROCEDURE — 88341 PR IHC OR ICC EACH ADD'L SINGLE ANTIBODY  STAINPR: ICD-10-PCS | Mod: 26,,, | Performed by: PATHOLOGY

## 2022-07-21 PROCEDURE — 37000008 HC ANESTHESIA 1ST 15 MINUTES: Performed by: INTERNAL MEDICINE

## 2022-07-21 RX ORDER — DEXAMETHASONE SODIUM PHOSPHATE 4 MG/ML
INJECTION, SOLUTION INTRA-ARTICULAR; INTRALESIONAL; INTRAMUSCULAR; INTRAVENOUS; SOFT TISSUE
Status: DISCONTINUED | OUTPATIENT
Start: 2022-07-21 | End: 2022-07-21

## 2022-07-21 RX ORDER — ONDANSETRON 2 MG/ML
INJECTION INTRAMUSCULAR; INTRAVENOUS
Status: DISCONTINUED | OUTPATIENT
Start: 2022-07-21 | End: 2022-07-21

## 2022-07-21 RX ORDER — FENTANYL CITRATE 50 UG/ML
INJECTION, SOLUTION INTRAMUSCULAR; INTRAVENOUS
Status: DISCONTINUED | OUTPATIENT
Start: 2022-07-21 | End: 2022-07-21

## 2022-07-21 RX ORDER — SODIUM CHLORIDE 0.9 % (FLUSH) 0.9 %
3 SYRINGE (ML) INJECTION
Status: DISCONTINUED | OUTPATIENT
Start: 2022-07-21 | End: 2022-07-21 | Stop reason: HOSPADM

## 2022-07-21 RX ORDER — LIDOCAINE HYDROCHLORIDE 20 MG/ML
INJECTION INTRAVENOUS
Status: DISCONTINUED | OUTPATIENT
Start: 2022-07-21 | End: 2022-07-21

## 2022-07-21 RX ORDER — PROPOFOL 10 MG/ML
VIAL (ML) INTRAVENOUS
Status: DISCONTINUED | OUTPATIENT
Start: 2022-07-21 | End: 2022-07-21

## 2022-07-21 RX ORDER — SUCCINYLCHOLINE CHLORIDE 20 MG/ML
INJECTION INTRAMUSCULAR; INTRAVENOUS
Status: DISCONTINUED | OUTPATIENT
Start: 2022-07-21 | End: 2022-07-21

## 2022-07-21 RX ORDER — SODIUM CHLORIDE 9 MG/ML
INJECTION, SOLUTION INTRAVENOUS CONTINUOUS
Status: DISCONTINUED | OUTPATIENT
Start: 2022-07-21 | End: 2022-07-21 | Stop reason: HOSPADM

## 2022-07-21 RX ORDER — PHENYLEPHRINE HYDROCHLORIDE 10 MG/ML
INJECTION INTRAVENOUS
Status: DISCONTINUED | OUTPATIENT
Start: 2022-07-21 | End: 2022-07-21

## 2022-07-21 RX ADMIN — PROPOFOL 125 MG: 10 INJECTION, EMULSION INTRAVENOUS at 03:07

## 2022-07-21 RX ADMIN — GLYCOPYRROLATE 0.1 MG: 0.2 INJECTION, SOLUTION INTRAMUSCULAR; INTRAVITREAL at 03:07

## 2022-07-21 RX ADMIN — LIDOCAINE HYDROCHLORIDE 100 MG: 20 INJECTION, SOLUTION INTRAVENOUS at 03:07

## 2022-07-21 RX ADMIN — PHENYLEPHRINE HYDROCHLORIDE 100 MCG: 10 INJECTION INTRAVENOUS at 04:07

## 2022-07-21 RX ADMIN — SODIUM CHLORIDE: 0.9 INJECTION, SOLUTION INTRAVENOUS at 01:07

## 2022-07-21 RX ADMIN — HYPROMELLOSE 2910 2 DROP: 5 SOLUTION OPHTHALMIC at 03:07

## 2022-07-21 RX ADMIN — FENTANYL CITRATE 100 MCG: 50 INJECTION, SOLUTION INTRAMUSCULAR; INTRAVENOUS at 03:07

## 2022-07-21 RX ADMIN — SUCCINYLCHOLINE CHLORIDE 100 MG: 20 INJECTION, SOLUTION INTRAMUSCULAR; INTRAVENOUS at 03:07

## 2022-07-21 RX ADMIN — ONDANSETRON 8 MG: 2 INJECTION, SOLUTION INTRAMUSCULAR; INTRAVENOUS at 03:07

## 2022-07-21 RX ADMIN — DEXAMETHASONE SODIUM PHOSPHATE 8 MG: 4 INJECTION, SOLUTION INTRAMUSCULAR; INTRAVENOUS at 03:07

## 2022-07-21 NOTE — H&P
[facility]    CC: Anemia    HPI 72 y.o. female with a history of tension H/A, HTN, IC presents as a referral from Dr. Franklin for a 1 year history of SHYANN associated with small bowel ulceration on VCE. She presents today for evaluation for DBE to evaluate the observed ulceration on DBE as well as for removal of the retained VCE.       Past Medical History  Past Medical History:   Diagnosis Date    Hypercholesteremia     Hypertension     IC (interstitial cystitis)     Osteopenia        Physical Examination  There were no vitals taken for this visit.  General appearance: alert, cooperative, no distress  Abdomen: soft, non-tender; bowel sounds normoactive; no organomegaly  Extremities: extremities symmetric  Neurologic: Alert and oriented X 3    Labs:  Lab Results   Component Value Date    WBC 6.08 07/20/2022    HGB 12.1 07/20/2022    HCT 36.8 (L) 07/20/2022    MCV 90 07/20/2022     07/20/2022     VCE images reviewed which show ulceration in the likely proximal-mid ileum    Assessment:   1. Anemia  2. Abnormal imaging-  VCE with ulceration in the likely proximal-mid ileum  3. Retained video capsule    Plan:    Upper DBE today for biopsy of the ulcerated area to evaluate for NSAID enteropathy due to NSAIDs in the past for tension H/A and current therapy for cystitis which includes salicylate therapy as well as removal of retained capsule       Richard Parish MD   [address] [facility], [address]   [place] [address] [phone]

## 2022-07-21 NOTE — PROVATION PATIENT INSTRUCTIONS
Discharge Summary/Instructions after an Endoscopic Procedure  Patient Name: Kim Robles  Patient MRN: [id number]  Patient YOB: 1950  Thursday, July 21, 2022  Richard Parish MD  Dear patient,  As a result of recent federal legislation (The Federal Cures Act), you may   receive lab or pathology results from your procedure in your [facility]   account before your physician is able to contact you. Your physician or   their representative will relay the results to you with their   recommendations at their soonest availability.  Thank you,  Your health is very important to us during the Covid Crisis. Following your   procedure today, you will receive a daily text for 2 weeks asking about   signs or symptoms of Covid 19.  Please respond to this text when you   receive it so we can follow up and keep you as safe as possible.   RESTRICTIONS:  During your procedure today, you received medications for sedation.  These   medications may affect your judgment, balance and coordination.  Therefore,   for 24 hours, you have the following restrictions:   - DO NOT drive a car, operate machinery, make legal/financial decisions,   sign important papers or drink alcohol.    ACTIVITY:  Today: no heavy lifting, straining or running due to procedural   sedation/anesthesia.  The following day: return to full activity including work.  DIET:  Eat and drink normally unless instructed otherwise.     TREATMENT FOR COMMON SIDE EFFECTS:  - Mild abdominal pain, nausea, belching, bloating or excessive gas:  rest,   eat lightly and use a heating pad.  - Sore Throat: treat with throat lozenges and/or gargle with warm salt   water.  - Because air was used during the procedure, expelling large amounts of air   from your rectum or belching is normal.  - If a bowel prep was taken, you may not have a bowel movement for 1-3 days.    This is normal.  SYMPTOMS TO WATCH FOR AND REPORT TO YOUR PHYSICIAN:  1. Abdominal pain or bloating, other than gas  cramps.  2. Chest pain.  3. Back pain.  4. Signs of infection such as: chills or fever occurring within 24 hours   after the procedure.  5. Rectal bleeding, which would show as bright red, maroon, or black stools.   (A tablespoon of blood from the rectum is not serious, especially if   hemorrhoids are present.)  6. Vomiting.  7. Weakness or dizziness.  GO DIRECTLY TO THE NEAREST EMERGENCY ROOM IF YOU HAVE ANY OF THE FOLLOWING:      Difficulty breathing              Chills and/or fever over 101 F   Persistent vomiting and/or vomiting blood   Severe abdominal pain   Severe chest pain   Black, tarry stools   Bleeding- more than one tablespoon   Any other symptom or condition that you feel may need urgent attention  Your doctor recommends these additional instructions:  If any biopsies were taken, your doctors clinic will contact you in 1 to 2   weeks with any results.  - Stop Uribel and switch to an alternative therapy   - Avoid all NSAIDs   - Start misoprostol 200mg qid x 8 weeks   - Follow up biopsy results   - Obtain repeat KUB as outpatient   - If ulceration fails to heal and causes symptoms of anemia or obstruction,   consider laparoscopic resection of the mid ileum after tattoo marking of   the distal extent of disease by lower DBE   - Discharge patient to home.   - Condition stable   - The signs and symptoms of potential delayed complications were discussed   with the patient. If signs or symptoms of these complications develop, call   the [facility] at 1 [phone].   - Return to normal activities tomorrow.  Written discharge instructions were   provided to the patient.  For questions, problems or results please call your physician - Richard Parish MD.  EMERGENCY PHONE NUMBER: [phone],  LAB RESULTS: [phone]  IF A COMPLICATION OR EMERGENCY SITUATION ARISES AND YOU ARE UNABLE TO REACH   YOUR PHYSICIAN - GO DIRECTLY TO THE EMERGENCY ROOM.  MD Richard Lee,  MD  7/21/2022 5:43:12 PM  This report has been verified and signed electronically.  Dear patient,  As a result of recent federal legislation (The Federal Cures Act), you may   receive lab or pathology results from your procedure in your [facility]   account before your physician is able to contact you. Your physician or   their representative will relay the results to you with their   recommendations at their soonest availability.  Thank you,  PROVATION

## 2022-07-21 NOTE — ANESTHESIA POSTPROCEDURE EVALUATION
Anesthesia Post Evaluation    Patient: Kim Robles    Procedure(s) Performed: Procedure(s) (LRB):  Long upper DBE with general anesthesia (N/A)    Final Anesthesia Type: general      Patient location during evaluation: PACU  Patient participation: Yes- Able to Participate  Level of consciousness: awake and alert  Post-procedure vital signs: reviewed and stable  Pain management: adequate  Airway patency: patent    PONV status at discharge: No PONV  Anesthetic complications: no      Cardiovascular status: blood pressure returned to baseline  Respiratory status: unassisted  Hydration status: euvolemic  Follow-up not needed.          Vitals Value Taken Time   /67 07/21/22 1730   Temp 36.7 °C (98.1 °F) 07/21/22 1730   Pulse 75 07/21/22 1730   Resp 18 07/21/22 1730   SpO2 99 % 07/21/22 1730         Event Time   Out of Recovery 17:25:00         Pain/Sadie Score: Sadie Score: 10 (7/21/2022  5:30 PM)

## 2022-07-21 NOTE — ANESTHESIA PROCEDURE NOTES
Intubation    Date/Time: 7/21/2022 3:25 PM  Performed by: Anthony Garcia CRNA  Authorized by: Diana Gallegos MD     Intubation:     Induction:  Intravenous    Intubated:  Postinduction    Mask Ventilation:  Easy mask    Attempts:  1    Attempted By:  KIKI    Blade:  Lopez 3    Laryngeal View Grade: Grade I - full view of cords      Difficult Airway Encountered?: No      Complications:  None    Airway Device:  Oral endotracheal tube    Airway Device Size:  7.0    Style/Cuff Inflation:  Cuffed (inflated to minimal occlusive pressure)    Inflation Amount (mL):  4    Tube secured:  20    Secured at:  The lips    Placement Verified By:  Capnometry    Complicating Factors:  None    Findings Post-Intubation:  BS equal bilateral and atraumatic/condition of teeth unchanged

## 2022-07-21 NOTE — ANESTHESIA PREPROCEDURE EVALUATION
2022  Kim Robles is a 72 y.o., female for upper DBE  Past Medical History:   Diagnosis Date    Basal cell carcinoma     Hypercholesteremia     Hypertension     IC (interstitial cystitis)     Osteopenia      Past Surgical History:   Procedure Laterality Date     SECTION      x2    JOINT REPLACEMENT      THYROIDECTOMY, PARTIAL             Pre-op Assessment       I have reviewed the Medications.     Review of Systems  Anesthesia Hx:  Denies Family Hx of Anesthesia complications.    Social:  Alcohol Use, Non-Smoker    Cardiovascular:   Hypertension        Physical Exam  General: Well nourished    Airway:  Mallampati: II   TM Distance: Normal  Neck ROM: Normal ROM    Dental:  Intact    Chest/Lungs:  Clear to auscultation    Heart:  Rate: Normal  Rhythm: Regular Rhythm        Anesthesia Plan  Type of Anesthesia, risks & benefits discussed:    Anesthesia Type: Gen ETT  Intra-op Monitoring Plan: Standard ASA Monitors  Post Op Pain Control Plan: multimodal analgesia  Informed Consent: Informed consent signed with the Patient and all parties understand the risks and agree with anesthesia plan.  All questions answered.   ASA Score: 2    Ready For Surgery From Anesthesia Perspective.     .

## 2022-07-21 NOTE — TRANSFER OF CARE
"Anesthesia Transfer of Care Note    Patient: Kim Robles    Procedure(s) Performed: Procedure(s) (LRB):  Long upper DBE with general anesthesia (N/A)    Patient location: PACU    Anesthesia Type: general    Transport from OR: Transported from OR on 2-3 L/min O2 by NC with adequate spontaneous ventilation    Post pain: adequate analgesia    Post assessment: no apparent anesthetic complications and tolerated procedure well    Post vital signs: stable    Level of consciousness: awake and alert    Nausea/Vomiting: no nausea/vomiting    Complications: none    Transfer of care protocol was followed      Last vitals:   Visit Vitals  BP (!) 140/70 (BP Location: Left arm, Patient Position: Lying)   Pulse 72   Temp 36.7 °C (98.1 °F) (Temporal)   Resp 20   Ht 5' 2" (1.575 m)   Wt 55.3 kg (122 lb)   SpO2 100%   Breastfeeding No   BMI 22.31 kg/m²     "

## 2022-07-21 NOTE — PLAN OF CARE
Patient has met PACU discharge criteria, VSS, pain well controlled. Family updated by phone. Released from PACU by Dr. Marcelino.

## 2022-07-22 ENCOUNTER — TELEPHONE (OUTPATIENT)
Dept: NEUROLOGY | Facility: HOSPITAL | Age: 72
End: 2022-07-22
Payer: MEDICARE

## 2022-07-22 NOTE — TELEPHONE ENCOUNTER
----- Message from Eloise Keys, Patient Care Assistant sent at 7/22/2022 10:59 AM CDT -----  Type:  Needs Medical Advice    Who Called:  pt  Symptoms (please be specific):  Patient would like a call back regarding a medication that was suppose to be sent to the pharmacy on yesterday by Dr Parish, after the patient procedure    Pharmacy name and phone #:  [facility] on [address] [place] [address]   phone # [phone]  Would the patient rather a call back or a response via [facility]?  call  Best Call Back Number:  [phone]  Additional Information:

## 2022-07-22 NOTE — TELEPHONE ENCOUNTER
Pt requesting when new medication will be sent to pharmacy, wants set to [facility] at [facility] in [place].  Pt notified pharmacy to contact when medication sent.  Pt acknowledged understanding.

## 2022-07-25 RX ORDER — MISOPROSTOL 200 UG/1
200 TABLET ORAL
Qty: 240 TABLET | Refills: 0 | Status: SHIPPED | OUTPATIENT
Start: 2022-07-25 | End: 2022-09-23

## 2022-07-28 LAB
FINAL PATHOLOGIC DIAGNOSIS: NORMAL
GROSS: NORMAL
Lab: NORMAL
MICROSCOPIC EXAM: NORMAL

## 2022-08-01 ENCOUNTER — TELEPHONE (OUTPATIENT)
Dept: NEUROLOGY | Facility: HOSPITAL | Age: 72
End: 2022-08-01
Payer: MEDICARE

## 2022-08-01 NOTE — TELEPHONE ENCOUNTER
Called patient with path results which are consistent with NSAID enteropathy. She has stopped her NSAID therapy for cystitis and her symptoms of abdominal pain are improving on misoprostol.   Plan:   Clinic visit in 6 weeks   KUB in 5 weeks prior to clinic visit to ensure capsule has passed     Richard Parish MD

## 2022-10-13 ENCOUNTER — TELEPHONE (OUTPATIENT)
Dept: NEUROLOGY | Facility: HOSPITAL | Age: 72
End: 2022-10-13
Payer: MEDICARE

## 2022-10-13 NOTE — TELEPHONE ENCOUNTER
----- Message from Melinda Dyer sent at 10/13/2022 12:46 PM CDT -----  Pt would like to be called back regarding  scheduling an appt    Pt can be reached at   [phone]

## 2022-10-19 ENCOUNTER — TELEPHONE (OUTPATIENT)
Dept: NEUROLOGY | Facility: HOSPITAL | Age: 72
End: 2022-10-19
Payer: MEDICARE

## 2022-10-19 NOTE — TELEPHONE ENCOUNTER
Clinic follow up rescheduled with pt to earlier time of 915am on Monday, October 31, 2022.  Pt repeated date and time correctly.

## 2022-10-31 ENCOUNTER — HOSPITAL ENCOUNTER (OUTPATIENT)
Dept: RADIOLOGY | Facility: HOSPITAL | Age: 72
Discharge: HOME | End: 2022-10-31
Attending: INTERNAL MEDICINE
Payer: MEDICARE

## 2022-10-31 ENCOUNTER — OFFICE VISIT (OUTPATIENT)
Dept: NEUROLOGY | Facility: HOSPITAL | Age: 72
End: 2022-10-31
Attending: INTERNAL MEDICINE
Payer: MEDICARE

## 2022-10-31 VITALS
HEIGHT: 62 IN | SYSTOLIC BLOOD PRESSURE: 162 MMHG | BODY MASS INDEX: 22.78 KG/M2 | DIASTOLIC BLOOD PRESSURE: 83 MMHG | WEIGHT: 123.81 LBS | HEART RATE: 56 BPM

## 2022-10-31 DIAGNOSIS — R10.9 ABDOMINAL PAIN, UNSPECIFIED ABDOMINAL LOCATION: Primary | ICD-10-CM

## 2022-10-31 DIAGNOSIS — R10.9 ABDOMINAL PAIN, UNSPECIFIED ABDOMINAL LOCATION: ICD-10-CM

## 2022-10-31 PROCEDURE — 99215 OFFICE O/P EST HI 40 MIN: CPT | Performed by: INTERNAL MEDICINE

## 2022-10-31 PROCEDURE — 74018 RADEX ABDOMEN 1 VIEW: CPT | Mod: TC,FY

## 2022-10-31 PROCEDURE — 74018 RADEX ABDOMEN 1 VIEW: CPT | Mod: 26,,, | Performed by: RADIOLOGY

## 2022-10-31 PROCEDURE — 74018 XR KUB: ICD-10-PCS | Mod: 26,,, | Performed by: RADIOLOGY

## 2022-10-31 NOTE — PROGRESS NOTES
"  [facility]    CC: anemia     HPI 72 y.o. 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    Interval History:  She has completed her course of misoprostol 2 months ago and has since stopped using Uribel (combination medication with NSAID). Denies melena or rectal bleeding but sometimes has red color to her stools which she attributes to eating goji berries. Takes tylenol as needed for back pain. Feels much better overall.    Past Medical History  Past Medical History:   Diagnosis Date    Basal cell carcinoma     Hypercholesteremia     Hypertension     IC (interstitial cystitis)     Osteopenia      Physical Examination  BP (!) 162/83 (BP Location: Left arm, Patient Position: Sitting)   Pulse (!) 56   Ht 5' 2" (1.575 m)   Wt 56.1 kg (123 lb 12.6 oz)   BMI 22.64 kg/m²   General appearance: alert, cooperative, no distress  Abdomen: soft, non-tender; bowel sounds normoactive; no organomegaly  Extremities: extremities symmetric; no clubbing, cyanosis, or edema  Neurologic: Alert and oriented X 3, normal strength, normal coordination and gait    Labs:  Lab Results   Component Value Date    WBC 6.08 07/20/2022    HGB 12.1 07/20/2022    HCT 36.8 (L) 07/20/2022    MCV 90 07/20/2022     07/20/2022      Labs from 10/18/22:  Hgb 12.6  MCV 88.6  Ferritin 41.7  TIBC 368      Upper device assisted enteroscopy 7/21/22 with 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 female with a history of tension H/A, HTN, IC presents for follow up of 1 year history of SHYANN associated with small bowel ulceration on " VCE found to be due to NSAID enteropathy. She experienced dramatic improvement in her GI symptoms off NSAIDs and on misoprostol   Recommend:  Conntinue to avoid NSAIDs previously prescribed for interstitial cystitis         Richard Parish MD   [address] [facility], [address]   [place] [address] [phone]

## 2022-12-27 ENCOUNTER — OFFICE VISIT (OUTPATIENT)
Dept: UROLOGY | Facility: CLINIC | Age: 72
End: 2022-12-27
Payer: MEDICARE

## 2022-12-27 VITALS — BODY MASS INDEX: 22.06 KG/M2 | WEIGHT: 120.56 LBS

## 2022-12-27 DIAGNOSIS — N39.0 RECURRENT UTI: ICD-10-CM

## 2022-12-27 DIAGNOSIS — R33.9 INCOMPLETE BLADDER EMPTYING: ICD-10-CM

## 2022-12-27 DIAGNOSIS — N30.10 INTERSTITIAL CYSTITIS: Primary | ICD-10-CM

## 2022-12-27 PROCEDURE — 99214 PR OFFICE/OUTPT VISIT, EST, LEVL IV, 30-39 MIN: ICD-10-PCS | Mod: S$PBB,,, | Performed by: UROLOGY

## 2022-12-27 PROCEDURE — 99999 PR PBB SHADOW E&M-EST. PATIENT-LVL IV: CPT | Mod: PBBFAC,,, | Performed by: UROLOGY

## 2022-12-27 PROCEDURE — 99214 OFFICE O/P EST MOD 30 MIN: CPT | Mod: S$PBB,,, | Performed by: UROLOGY

## 2022-12-27 PROCEDURE — 99214 OFFICE O/P EST MOD 30 MIN: CPT | Mod: PBBFAC | Performed by: UROLOGY

## 2022-12-27 PROCEDURE — 99999 PR PBB SHADOW E&M-EST. PATIENT-LVL IV: ICD-10-PCS | Mod: PBBFAC,,, | Performed by: UROLOGY

## 2022-12-27 RX ORDER — VALSARTAN 160 MG/1
160 TABLET ORAL 2 TIMES DAILY
COMMUNITY
Start: 2022-11-17

## 2022-12-27 RX ORDER — MUPIROCIN 20 MG/G
OINTMENT TOPICAL
COMMUNITY
Start: 2022-12-06

## 2022-12-27 NOTE — PROGRESS NOTES
Subjective:       Kim Robles is a 72 y.o. female who is an established pt who was seen for evaluation of IC.      Previous pt of Dr Ramires for over 10 years. Diagnosed with IC. Stable on Elmiron and Urogesic blue/Uribel. Never underwent any procedures or instillations. Doing well on medications.     She reports several UTIs that started around time of a vacation (when holding her urine while traveling). She was given PRN Bactrim for traveling - has not needed it.     Insurance issues with coverage of Uribel - doing better now.    PVR (bladder scan) last visit - 292cc. She does do Crede occasionally and is already double voiding.     She returned with more urgency and sensation of ABDON. She took Bactrim in 4/18 due to concern for UTI (vs flare) around time of vacation (holds urine). She is noting more dietary and stress triggers. More constipation recently - improved with Miralax. Nocturia x 3.    7/16/2020  Annual visit - doing well. PVR more elevated but not symptomatic. She has adjusted Uribel rx - getting 90 tabs (22 days). Tried to take it TID rather than QID. Taking OTC cranberry.  PVR more elevated. Linzess helping with bowels.     PVR (bladder scan) last visit - >400cc, 252cc, 221cc, 514cc    10/19/2021  Has been doing well. Would like to decrease medications. No recent flares/UTIs. UA clear. Uribel TID, Elimiron BID.   PVR (bladder scan) today - 355cc    12/27/2022  Doing well. UA clear. PVR much lower today. Off Uribel due to GI issue (small bleeding ulcer). Elmiron alternating 2tabs/1tab each day. Has been doing well with lower dosing.  PVR (bladder scan) today - 19cc      The following portions of the patient's history were reviewed and updated as appropriate: allergies, current medications, past family history, past medical history, past social history, past surgical history and problem list.    Review of Systems  Constitutional: no fever or chills  ENT: no nasal congestion or sore  throat  Respiratory: no cough or shortness of breath  Cardiovascular: no chest pain or palpitations  Gastrointestinal: no nausea or vomiting, tolerating diet  Genitourinary: as per HPI  Hematologic/Lymphatic: no easy bruising or lymphadenopathy  Musculoskeletal: no arthralgias or myalgias  Skin: no rashes or lesions  Neurological: no seizures or tremors  Behavioral/Psych: no auditory or visual hallucinations       Objective:    Vitals:   Wt 54.7 kg (120 lb 9.5 oz)   BMI 22.06 kg/m²     Physical Exam   General: well developed, well nourished in no acute distress  Head: normocephalic, atraumatic  Neck: supple, trachea midline, no obvious enlargement of thyroid  HEENT: EOMI, mucus membranes moist, sclera anicteric, no hearing impairment  Lungs: symmetric expansion, non-labored breathing  Skin: no rashes or lesions  Neuro: alert and oriented x 3, no gross deficits  Psych: normal judgment and insight, normal mood/affect and non-anxious  Genitourinary:   patient declined exam      Lab Review   Urine analysis today in clinic shows - negative    Lab Results   Component Value Date    WBC 6.08 07/20/2022    HGB 12.1 07/20/2022    HCT 36.8 (L) 07/20/2022    MCV 90 07/20/2022     07/20/2022     Lab Results   Component Value Date    CREATININE 0.7 07/20/2022    BUN 8 07/20/2022     Imaging  NA       Assessment/Plan:      1. Interstitial cystitis    - Reducing Elmiron dosing - doing well    - Recommend f/u with eye doctor 2/2 Elmiron for retinopathy surveillance   - Off Uribel due to small GI bleed     2. ABDON   - Double voiding   - PVR even higher now   - ?Flomax ?Urecholine   - Miralax now for constipation - doing well    - Urodynamics/cystoscopy   - ?CIC - okay to hold   - ?InterStim   - Asymptomatic   - PVR much lower today, will monitor    3. Recurrent UTI   - Bactrim rx for self-start PRN   - UCx with UTI symptoms    - No issues currently      Follow up in 12 months with PVR

## 2023-03-14 ENCOUNTER — TELEPHONE (OUTPATIENT)
Dept: GASTROENTEROLOGY | Facility: CLINIC | Age: 73
End: 2023-03-14
Payer: MEDICARE

## 2023-03-14 NOTE — TELEPHONE ENCOUNTER
Clinic appt scheduled with pt on Monday, March 20, 2023 at 1pm.  Pt repeated date and time correctly.

## 2023-03-14 NOTE — TELEPHONE ENCOUNTER
----- Message from Marah Cruz sent at 3/14/2023  4:13 PM CDT -----  Contact: pt  Type:  Sooner Appointment Request    Caller is requesting a sooner appointment.  Caller declined first available appointment listed below.  Caller will not accept being placed on the waitlist and is requesting a message be sent to doctor.  Name of Caller:pt   When is the first available appointment?  Symptoms:Iron   Would the patient rather a call back or a response via [facility]? Call   Best Call Back Number:[phone]  Additional Information:

## 2023-03-20 ENCOUNTER — OFFICE VISIT (OUTPATIENT)
Dept: GASTROENTEROLOGY | Facility: CLINIC | Age: 73
End: 2023-03-20
Payer: MEDICARE

## 2023-03-20 VITALS
SYSTOLIC BLOOD PRESSURE: 148 MMHG | TEMPERATURE: 99 F | HEIGHT: 62 IN | WEIGHT: 122.44 LBS | BODY MASS INDEX: 22.53 KG/M2 | DIASTOLIC BLOOD PRESSURE: 79 MMHG | HEART RATE: 88 BPM

## 2023-03-20 DIAGNOSIS — K63.9 NONSTEROIDAL ANTI-INFLAMMATORY DRUG (NSAID) INDUCED ENTEROPATHY: Primary | ICD-10-CM

## 2023-03-20 DIAGNOSIS — T39.395A NONSTEROIDAL ANTI-INFLAMMATORY DRUG (NSAID) INDUCED ENTEROPATHY: Primary | ICD-10-CM

## 2023-03-20 PROCEDURE — 99214 OFFICE O/P EST MOD 30 MIN: CPT | Mod: PBBFAC,PO | Performed by: INTERNAL MEDICINE

## 2023-03-20 NOTE — PROGRESS NOTES
[facility]    CC: anemia    HPI 72 y.o. 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    Interval History:  Overall doing well, no GI symptoms. Referred back by hematology due to worsening iron panel but hgb is normal. She has been off iron for 3 months.     3/13:  Hb 11.9, MCV 84  Ferritin 10.1, iron 52, TIBC 472    Physical Examination  General appearance: alert, cooperative, no distress  Abdomen: soft, non-tender; bowel sounds normoactive; no organomegaly    Assessment:   72F with SHYANN related to NSAID therapy for IC resulting in NSAID enteropathy s/p course of misoprostol with improvement in iron studies but with recent downtrend in iron studies w/o downtrend in hgb. This patient's case of NSAID enteropathy may take several years to heal such that she will experience chronic GI blood loss for some time     Plan:  -Her SHYANN is related to NSAID enteropathy and although she responded clinically to misoprostol it is not uncommon for these lesions to heal very slowly if at all. The only other treatment is surgical resection which is definitive but quite invasive  -Since she is doing well overall and iron studies worsened after cessation I recommend restarting/continuing oral iron supplementation indefinitely as her diagnosis is clearly defined  -She may even need a course of injectafer 750mg x 2 doses every 6-12 months to keep up with slow chronic blood loss for her NSAID related injury  -complete NSAID avoidance    Total encounter time including all elements >30 min     Richard Parish MD    [address] [facility], [address]   [place] [address]   [phone]

## 2023-08-15 RX ORDER — PENTOSAN POLYSULFATE SODIUM 100 MG/1
100 CAPSULE, GELATIN COATED ORAL 2 TIMES DAILY
Qty: 180 CAPSULE | Refills: 3 | Status: SHIPPED | OUTPATIENT
Start: 2023-08-15

## 2023-10-16 ENCOUNTER — PATIENT MESSAGE (OUTPATIENT)
Dept: GASTROENTEROLOGY | Facility: CLINIC | Age: 73
End: 2023-10-16
Payer: MEDICARE

## 2023-10-16 ENCOUNTER — TELEPHONE (OUTPATIENT)
Dept: GASTROENTEROLOGY | Facility: CLINIC | Age: 73
End: 2023-10-16
Payer: MEDICARE

## 2023-10-16 NOTE — TELEPHONE ENCOUNTER
Clinic appt scheduled with pt on Wednesday, October 25, 2023 at 1030am per pt request.  Clinic address given and repeated correctly.

## 2023-10-25 ENCOUNTER — OFFICE VISIT (OUTPATIENT)
Dept: GASTROENTEROLOGY | Facility: CLINIC | Age: 73
End: 2023-10-25
Payer: MEDICARE

## 2023-10-25 VITALS — HEIGHT: 62 IN | BODY MASS INDEX: 22.55 KG/M2 | WEIGHT: 122.56 LBS

## 2023-10-25 DIAGNOSIS — D50.0 ANEMIA DUE TO CHRONIC BLOOD LOSS: Primary | ICD-10-CM

## 2023-10-25 PROCEDURE — 99999 PR PBB SHADOW E&M-EST. PATIENT-LVL II: CPT | Mod: PBBFAC,,, | Performed by: INTERNAL MEDICINE

## 2023-10-25 PROCEDURE — 99214 OFFICE O/P EST MOD 30 MIN: CPT | Mod: S$PBB,,, | Performed by: INTERNAL MEDICINE

## 2023-10-25 PROCEDURE — 99212 OFFICE O/P EST SF 10 MIN: CPT | Mod: PBBFAC,PO | Performed by: INTERNAL MEDICINE

## 2023-10-25 PROCEDURE — 99999 PR PBB SHADOW E&M-EST. PATIENT-LVL II: ICD-10-PCS | Mod: PBBFAC,,, | Performed by: INTERNAL MEDICINE

## 2023-10-25 PROCEDURE — 99214 PR OFFICE/OUTPT VISIT, EST, LEVL IV, 30-39 MIN: ICD-10-PCS | Mod: S$PBB,,, | Performed by: INTERNAL MEDICINE

## 2023-10-25 RX ORDER — MISOPROSTOL 200 UG/1
200 TABLET ORAL
Qty: 224 TABLET | Refills: 0 | Status: SHIPPED | OUTPATIENT
Start: 2023-10-25 | End: 2023-12-20

## 2023-10-25 NOTE — PROGRESS NOTES
"[facility]    HPI 73 y.o. 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 Took a course of mistoprostol and stopped nsaids prescribed for her cystitis. Her anemia improved following this therapy but her iron deficiency persists. She has taken ~6 courses of iv iron (Venofer) over the past 18 months.     Physical Examination  Ht 5' 2" (1.575 m)   Wt 55.6 kg (122 lb 9.2 oz)   BMI 22.42 kg/m²   General appearance: alert, cooperative, no distress  Abdomen: soft, non-tender; bowel sounds normoactive; no organomegaly  Lab Results   Component Value Date    WBC 6.08 07/20/2022    HGB 12.1 07/20/2022    HCT 36.8 (L) 07/20/2022    MCV 90 07/20/2022     07/20/2022     73 y.o. 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 Took a course of mistoprostol and stopped nsaids prescribed for her cystitis. Her anemia improved following this therapy but her iron deficiency persists. She has taken ~6 courses of iv iron (Venofer) over the past 18 months. This is a chronic problem with recent exacerbation     Plan:   Proceed with a 2nd course of misoprostol 200mcg qid x 8 weeks   Consider a change in iron therapy to higher dose Injectofer or Feraheme  Avoid NSAIDs  If this patient develops recurrent anemia due to blood loss, I will plan lower DBE to exam for additional ulcers in the distal ileum as well as tattoo the most distal lesion in preparation for potential laparoscopic small bowel resection to remove the diseased segment of small bowel         "

## 2024-02-26 ENCOUNTER — OFFICE VISIT (OUTPATIENT)
Dept: UROLOGY | Facility: CLINIC | Age: 74
End: 2024-02-26
Payer: MEDICARE

## 2024-02-26 DIAGNOSIS — N39.0 RECURRENT UTI: ICD-10-CM

## 2024-02-26 DIAGNOSIS — R33.9 INCOMPLETE BLADDER EMPTYING: ICD-10-CM

## 2024-02-26 DIAGNOSIS — N39.41 URGE INCONTINENCE: ICD-10-CM

## 2024-02-26 DIAGNOSIS — N30.10 INTERSTITIAL CYSTITIS: Primary | ICD-10-CM

## 2024-02-26 PROCEDURE — 99214 OFFICE O/P EST MOD 30 MIN: CPT | Mod: PBBFAC | Performed by: UROLOGY

## 2024-02-26 PROCEDURE — 99999 PR PBB SHADOW E&M-EST. PATIENT-LVL IV: CPT | Mod: PBBFAC,,, | Performed by: UROLOGY

## 2024-02-26 PROCEDURE — 99214 OFFICE O/P EST MOD 30 MIN: CPT | Mod: S$PBB,,, | Performed by: UROLOGY

## 2024-02-26 RX ORDER — TRAMADOL HYDROCHLORIDE 50 MG/1
50 TABLET ORAL 2 TIMES DAILY PRN
COMMUNITY

## 2024-02-26 NOTE — PATIENT INSTRUCTIONS
BLADDER RETRAINING   CONTROLLING URGENCY AND FREQUENCY     URGENCY is the sudden need to urinate that may cause urine to leak on the way to the bathroom.     FREQUENCY is urinating often, usually 8 times or more in a full day (24 hours). Many times, the amount of urine leaked is only small. Frequency can get worse if you start the habit of urinating just in case, because the bladder never fills completely and gets used to holding a small amount of urine. It is better to wait until the bladder is full.   Urgency and Frequency happen when the bladder muscle starts to contract before you go to the toilet.     Urgency follows a wave pattern; it starts, grows, peaks, and then subsides until it stops.   The key to controlling the urinary urgency is practicing bladder training. When you feel a sudden, urgent need to urinate, do not rush or try to hurry to the bathroom. Rushing will jiggle your bladder and increase the urge to go.     Follow a Bladder training Schedule   To start bladder training, urinate every hour from the time you get up in the morning until you go to bed at night. If you have an urge to urinate before 1 hour has passed, then you may need to start with a shorter time, such as every 30 minutes. Continue to urinate on this schedule until you can do it every day for a week. With your urgency under control, increase the time between urinating by 15-30 minutes each week until you can last 3 to 4 hours. If you dont succeed during any week, stay on the same schedule for one more week.     Increase your voiding intervals by 1?2 hour every week, until you can wait to void every 3-4 hours or even longer.     TAKE CONTROL OF URGENCY   You can take control over your urgency and frequency by using some simple strategies. If you can distract yourself long enough, often the feeling of urgency will pass. It may take some practice with these strategies, but over time you will see that you are gaining more control  and experiencing fewer episodes of urgency.     When you feel urgency, try these strategies:    Focus on another body sensation. Deep breathing is good. Sit down and take five slow, deep breaths. Think about the air moving in and out of your lungs instead of how your bladder feels.      Squeeze your pelvic floor muscles five times quickly and strongly. These are called quick flicks. Often, this will relax the bladder so that the feeling of urgency goes away. Or, you could try holding one strong squeeze of your pelvic floor muscles. A good way to do this is to sit down and press your thighs tightly together. Try each way and see which one works best for you.      Distract yourself by focusing on a mental activity:     Use mind games. Turn your attention to counting backward from 100 by 7s or  working on a crossword puzzle.     Do a task that requires a lot of thought - for example, balance your checkbook,  write a letter, do handwork, plan the weekly food menus, or some other activity  that requires a great deal of attention. Note: TV watching and talking on the  telephone are not focuses your thinking. Note: TV watching and talking on the  telephone are not distracting enough.      Use self-talk or good self-statements. Tell yourself: I am the boss, not my bladder. I am in control. I can beat this. Create a statement that fits your situation and personality the best. Keep saying this statement over and over until the feeling of urgency passes.     Sometimes you will need to perform more than one of these approaches before the feeling of urgency goes away. If at first you do not succeed, do not give up. Remember, it takes practice to gain control over urgency.     When to Go to the Bathroom   After the urge goes away, try to wait until your scheduled urinating time before going to the bathroom. If you dont think you can wait that long, at least wait several minutes longer, then go to the bathroom whether you  feel you have to go or not. Never rush or run to the bathroom--walk slowly.     How Should I Start Bladder Training?   Begin by only practicing this at home where you are relaxed and the bathroom is nearby. Empty your bladder right before going to sleep to reduce the chances of waking up to urinate. Dont forget to cut down on drinks with caffeine and alcohol.     When Will I Notice a Change?   In 3-4 weeks you should see improvement. You should only be urinating every 3-4 hours during the day and be up less often at night. Do not despair or get discouraged if you do not see fast progress.

## 2024-02-26 NOTE — PROGRESS NOTES
Subjective:       Kim Robles is a 73 y.o. female who is an established pt who was seen for evaluation of IC.      Previous pt of Dr Ramires for over 10 years. Diagnosed with IC. Stable on Elmiron and Urogesic blue/Uribel. Never underwent any procedures or instillations. Doing well on medications.     She reports several UTIs that started around time of a vacation (when holding her urine while traveling). She was given PRN Bactrim for traveling - has not needed it.     Insurance issues with coverage of Uribel - doing better now.    PVR (bladder scan) last visit - 292cc. She does do Crede occasionally and is already double voiding.     She returned with more urgency and sensation of ABDON. She took Bactrim in 4/18 due to concern for UTI (vs flare) around time of vacation (holds urine). She is noting more dietary and stress triggers. More constipation recently - improved with Miralax. Nocturia x 3.    7/16/2020  Annual visit - doing well. PVR more elevated but not symptomatic. She has adjusted Uribel rx - getting 90 tabs (22 days). Tried to take it TID rather than QID. Taking OTC cranberry.  PVR more elevated. Linzess helping with bowels.     PVR (bladder scan) last visit - >400cc, 252cc, 221cc, 514cc    10/19/2021  Has been doing well. Would like to decrease medications. No recent flares/UTIs. UA clear. Uribel TID, Elimiron BID.   PVR (bladder scan) today - 355cc    12/27/2022  Doing well. UA clear. PVR much lower today. Off Uribel due to GI issue (small bleeding ulcer). Elmiron alternating 2tabs/1tab each day. Has been doing well with lower dosing.  PVR (bladder scan) today - 19cc    2/26/2024  Recent shoulder surgery. Notes more UI recently. Doing timed voiding. +urgency/UUI. Denies ART. Elmiron still working well for her - takes 1-2x daily.   PVR (bladder scan) today - 260cc      The following portions of the patient's history were reviewed and updated as appropriate: allergies, current medications, past  family history, past medical history, past social history, past surgical history and problem list.    Review of Systems  Constitutional: no fever or chills  ENT: no nasal congestion or sore throat  Respiratory: no cough or shortness of breath  Cardiovascular: no chest pain or palpitations  Gastrointestinal: no nausea or vomiting, tolerating diet  Genitourinary: as per HPI  Hematologic/Lymphatic: no easy bruising or lymphadenopathy  Musculoskeletal: no arthralgias or myalgias  Skin: no rashes or lesions  Neurological: no seizures or tremors  Behavioral/Psych: no auditory or visual hallucinations       Objective:    Vitals:   There were no vitals taken for this visit.    Physical Exam   General: well developed, well nourished in no acute distress  Head: normocephalic, atraumatic  Neck: supple, trachea midline, no obvious enlargement of thyroid  HEENT: EOMI, mucus membranes moist, sclera anicteric, no hearing impairment  Lungs: symmetric expansion, non-labored breathing  Skin: no rashes or lesions  Neuro: alert and oriented x 3, no gross deficits  Psych: normal judgment and insight, normal mood/affect and non-anxious  Genitourinary:   deferred      Lab Review   Urine analysis today in clinic shows - no urine     Lab Results   Component Value Date    WBC 6.08 07/20/2022    HGB 12.1 07/20/2022    HCT 36.8 (L) 07/20/2022    MCV 90 07/20/2022     07/20/2022     Lab Results   Component Value Date    CREATININE 0.70 03/13/2023    CREATININE 0.7 07/20/2022    BUN 11.0 03/13/2023    BUN 8 07/20/2022     Imaging  NA       Assessment/Plan:      1. Interstitial cystitis    - Reducing Elmiron dosing - doing well    - Recommend f/u with eye doctor 2/2 Elmiron for retinopathy surveillance   - Off Uribel due to small GI bleed     2. ABDON   - Double voiding   - PVR even higher now   - ?Flomax ?Urecholine   - Miralax now for constipation - doing well    - Urodynamics/cystoscopy   - ?CIC - okay to hold   - ?InterStim   -  Asymptomatic   - PVR much lower today, will monitor    3. Recurrent UTI   - Bactrim rx for self-start PRN   - UCx with UTI symptoms    - No issues currently    4. UUI   - More bothersome   - Quick-flicks/bladder retraining discussed. Will trial this prior to meds.    - Concern for OAB meds in setting of ABDON. Consider formal US.       Follow up in 12 months with PVR

## 2024-04-16 ENCOUNTER — TELEPHONE (OUTPATIENT)
Dept: GASTROENTEROLOGY | Facility: CLINIC | Age: 74
End: 2024-04-16
Payer: MEDICARE

## 2024-04-16 NOTE — TELEPHONE ENCOUNTER
----- Message from Betzy Keller sent at 4/16/2024  1:21 PM CDT -----  Type: Call Back     Who Called:Dr Macedo     Does the patient know what this is regarding?:Did not specify   Would the patient rather a call back or a response via [facility]? Call   Best Call Back Number: or   Additional Information:      Dr Macedo is requesting a call back from Dr. Parish

## 2024-10-23 ENCOUNTER — TELEPHONE (OUTPATIENT)
Dept: GASTROENTEROLOGY | Facility: CLINIC | Age: 74
End: 2024-10-23
Payer: MEDICARE

## 2024-10-23 NOTE — TELEPHONE ENCOUNTER
Clinic follow up scheduled with pt on Wednesday, October 30, 2024 at 915am per pt request.  Clinic address given and instructions to check in [facility] 1st floor prior to coming to [address].  Pt repeated all information given correctly.

## 2024-10-23 NOTE — TELEPHONE ENCOUNTER
----- Message from Betzy sent at 10/23/2024 12:50 PM CDT -----  Type:  Sooner Appointment Request    Caller is requesting a sooner appointment.  Caller declined first available appointment listed below.  Caller will not accept being placed on the waitlist and is requesting a message be sent to doctor.  Name of Caller:pt   When is the first available appointment?N/A  Symptoms:Anemic   Would the patient rather a call back or a response via [facility]? Call   Best Call Back Number: [phone]  Additional Information:

## 2024-10-30 ENCOUNTER — OFFICE VISIT (OUTPATIENT)
Dept: GASTROENTEROLOGY | Facility: CLINIC | Age: 74
End: 2024-10-30
Payer: MEDICARE

## 2024-10-30 VITALS
SYSTOLIC BLOOD PRESSURE: 126 MMHG | DIASTOLIC BLOOD PRESSURE: 75 MMHG | HEIGHT: 60 IN | WEIGHT: 122.13 LBS | HEART RATE: 54 BPM | BODY MASS INDEX: 23.98 KG/M2

## 2024-10-30 DIAGNOSIS — D50.0 ANEMIA DUE TO CHRONIC BLOOD LOSS: Primary | ICD-10-CM

## 2024-10-30 PROCEDURE — 99214 OFFICE O/P EST MOD 30 MIN: CPT | Mod: PBBFAC,PN | Performed by: INTERNAL MEDICINE

## 2024-10-30 PROCEDURE — 99999 PR PBB SHADOW E&M-EST. PATIENT-LVL IV: CPT | Mod: PBBFAC,,, | Performed by: INTERNAL MEDICINE

## 2024-10-30 PROCEDURE — 99024 POSTOP FOLLOW-UP VISIT: CPT | Mod: POP,,, | Performed by: INTERNAL MEDICINE

## 2024-10-30 NOTE — PROGRESS NOTES
[facility]    CC: anemia     HPI 74 y.o. 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  She previously completed misoprostol course x2 and stopped NSAIDS. She still reports no NSAID use.   She has been on iron supplementation therapy, last received injectafer on 9/30/24. She denies any symptoms of anemia including new onset fatigue, shortness of breath, chest pain. She has not required any pRBC transfusions. No evidence of GI bleeding per patient. She is accompanied by her .     Past Medical History  Past Medical History:   Diagnosis Date    Basal cell carcinoma     Hypercholesteremia     Hypertension     IC (interstitial cystitis)     Osteopenia      Labs  Hemoglobin 10.9  RDW 17.6    Ferritin 324    Physical Examination  /75 (BP Location: Left arm, Patient Position: Sitting)   Pulse (!) 54   Ht 5' (1.524 m)   Wt 55.4 kg (122 lb 2.2 oz)   BMI 23.85 kg/m²   General appearance: alert, cooperative, no distress  Lungs: clear to auscultation bilaterally, no dullness to percussion bilaterally  Heart: regular rate and rhythm without rub; no displacement of the PMI   Abdomen: soft, non-tender; bowel sounds normoactive; no organomegaly    Assessment:   74 yr female presenting for follow up of anemia 2/2 chronic GI blood loss from NSAID enteropathy. She is doing well/ asymptomatic and iron and hemoglobin stable (10.9 hemoglobin with baseline 11.5- 12) on iron supplementation. She has completed 2 courses of misoprostol previously.       Plan:  Continue iron supplementation with Injectofer   Continue to avoid NSAIDs   If anemia were to reoccur would plan for lower DBE as next step    Jadiel Watts MD   [facility] Fellow   Richard Parish MD   Super[facility] Staff

## 2024-11-13 RX ORDER — PENTOSAN POLYSULFATE SODIUM 100 MG/1
100 CAPSULE, GELATIN COATED ORAL 2 TIMES DAILY
Qty: 180 CAPSULE | Refills: 3 | Status: SHIPPED | OUTPATIENT
Start: 2024-11-13

## 2025-03-17 ENCOUNTER — OFFICE VISIT (OUTPATIENT)
Dept: UROLOGY | Facility: CLINIC | Age: 75
End: 2025-03-17
Payer: MEDICARE

## 2025-03-17 VITALS — WEIGHT: 119.63 LBS | BODY MASS INDEX: 23.36 KG/M2

## 2025-03-17 DIAGNOSIS — R33.9 INCOMPLETE BLADDER EMPTYING: ICD-10-CM

## 2025-03-17 DIAGNOSIS — N39.0 RECURRENT UTI: ICD-10-CM

## 2025-03-17 DIAGNOSIS — N39.41 URGE INCONTINENCE: ICD-10-CM

## 2025-03-17 DIAGNOSIS — N30.10 INTERSTITIAL CYSTITIS: Primary | ICD-10-CM

## 2025-03-17 PROCEDURE — 99213 OFFICE O/P EST LOW 20 MIN: CPT | Mod: PBBFAC | Performed by: UROLOGY

## 2025-03-17 PROCEDURE — 99214 OFFICE O/P EST MOD 30 MIN: CPT | Mod: S$PBB,,, | Performed by: UROLOGY

## 2025-03-17 PROCEDURE — 99999 PR PBB SHADOW E&M-EST. PATIENT-LVL III: CPT | Mod: PBBFAC,,, | Performed by: UROLOGY

## 2025-03-17 PROCEDURE — G2211 COMPLEX E/M VISIT ADD ON: HCPCS | Mod: S$PBB,,, | Performed by: UROLOGY

## 2025-03-17 RX ORDER — PENTOSAN POLYSULFATE SODIUM 100 MG/1
100 CAPSULE, GELATIN COATED ORAL 2 TIMES DAILY
Qty: 180 CAPSULE | Refills: 3 | Status: SHIPPED | OUTPATIENT
Start: 2025-03-17

## 2025-03-17 NOTE — PROGRESS NOTES
Subjective:       Kim Robles is a 74 y.o. female who is an established pt who was seen for evaluation of IC.      Previous pt of Dr Ramires for over 10 years. Diagnosed with IC. Stable on Elmiron and Urogesic blue/Uribel. Never underwent any procedures or instillations. Doing well on medications.     She reports several UTIs that started around time of a vacation (when holding her urine while traveling). She was given PRN Bactrim for traveling - has not needed it.     Insurance issues with coverage of Uribel - doing better now.    PVR (bladder scan) last visit - 292cc. She does do Crede occasionally and is already double voiding.     She returned with more urgency and sensation of ABDON. She took Bactrim in 4/18 due to concern for UTI (vs flare) around time of vacation (holds urine). She is noting more dietary and stress triggers. More constipation recently - improved with Miralax. Nocturia x 3.    7/16/2020  Annual visit - doing well. PVR more elevated but not symptomatic. She has adjusted Uribel rx - getting 90 tabs (22 days). Tried to take it TID rather than QID. Taking OTC cranberry.  PVR more elevated. Linzess helping with bowels.     PVR (bladder scan) last visit - >400cc, 252cc, 221cc, 514cc    10/19/2021  Has been doing well. Would like to decrease medications. No recent flares/UTIs. UA clear. Uribel TID, Elimiron BID.   PVR (bladder scan) today - 355cc    12/27/2022  Doing well. UA clear. PVR much lower today. Off Uribel due to GI issue (small bleeding ulcer). Elmiron alternating 2tabs/1tab each day. Has been doing well with lower dosing.  PVR (bladder scan) today - 19cc    2/26/2024  Recent shoulder surgery. Notes more UI recently. Doing timed voiding. +urgency/UUI. Denies ART. Elmiron still working well for her - takes 1-2x daily.   PVR (bladder scan) today - 260cc    3/17/2025  Remains on Elmiron - usually 1x daily, rarely 2x daily. Symptoms stable. Now retired, doing well.   PVR (bladder scan)  today - [facility]      The following portions of the patient's history were reviewed and updated as appropriate: allergies, current medications, past family history, past medical history, past social history, past surgical history and problem list.    Review of Systems  Constitutional: no fever or chills  ENT: no nasal congestion or sore throat  Respiratory: no cough or shortness of breath  Cardiovascular: no chest pain or palpitations  Gastrointestinal: no nausea or vomiting, tolerating diet  Genitourinary: as per HPI  Hematologic/Lymphatic: no easy bruising or lymphadenopathy  Musculoskeletal: no arthralgias or myalgias  Skin: no rashes or lesions  Neurological: no seizures or tremors  Behavioral/Psych: no auditory or visual hallucinations       Objective:    Vitals:   Wt 54.2 kg (119 lb 9.6 oz)   BMI 23.36 kg/m²     Physical Exam   General: well developed, well nourished in no acute distress  Head: normocephalic, atraumatic  Neck: supple, trachea midline, no obvious enlargement of thyroid  HEENT: EOMI, mucus membranes moist, sclera anicteric, no hearing impairment  Lungs: symmetric expansion, non-labored breathing  Neuro: alert and oriented x 3, no gross deficits  Psych: normal judgment and insight, normal mood/affect and non-anxious  Genitourinary:   deferred      Lab Review   Urine analysis today in clinic shows - trace protein     Lab Results   Component Value Date    WBC 6.08 07/20/2022    HGB 12.1 07/20/2022    HCT 36.8 (L) 07/20/2022    MCV 90 07/20/2022     07/20/2022     Lab Results   Component Value Date    CREATININE 0.72 08/21/2024    CREATININE 0.7 07/20/2022    BUN 10.9 08/21/2024    BUN 8 07/20/2022     Imaging  NA       Assessment/Plan:      1. Interstitial cystitis    - Reducing Elmiron dosing - doing well, now using usually 1x daily   - Recommend f/u with eye doctor 2/2 Elmiron for retinopathy surveillance   - Off Uribel due to small GI bleed     2. ABDON   - Double voiding   - PVR even higher  now   - ?Flomax ?Urecholine   - Miralax now for constipation - doing well    - Urodynamics/cystoscopy   - ?CIC - okay to hold   - ?InterStim   - Asymptomatic   - Will monitor PVR - stable    3. Recurrent UTI   - Bactrim rx for self-start PRN   - UCx with UTI symptoms    - No issues currently    4. UUI   - More bothersome   - Quick-flicks/bladder retraining discussed. Will trial this prior to meds.    - Concern for OAB meds in setting of ABDON. Consider formal US.    - ABDON stable      Follow up in 12 months with PVR     Visit today included increased complexity associated with the care of the episodic problem ABDON, IC addressed and managing the longitudinal care of the patient due to the serious and/or complex managed problem(s) ABDON, IC.

## 2025-04-17 ENCOUNTER — TELEPHONE (OUTPATIENT)
Dept: GASTROENTEROLOGY | Facility: CLINIC | Age: 75
End: 2025-04-17
Payer: MEDICARE

## 2025-04-17 NOTE — TELEPHONE ENCOUNTER
----- Message from Lorraine sent at 4/17/2025 10:28 AM CDT -----  Type:  Sooner Appointment RequestCaller is requesting a sooner appointment.  Caller declined first available appointment listed below.  Caller will not accept being placed on the waitlist and is requesting a message be sent to doctor.Name of Caller:pt When is the first available appointment?books are closed Symptoms:anemia f/uWould the patient rather a call back or a response via [facility]? Call Best Call Back Number:[phone] Information:

## 2025-04-17 NOTE — TELEPHONE ENCOUNTER
Pt requesting gi clinic appt per recommendations of hemo/onc.  Appt scheduled on Wednesday, April 23, 2025 at 945am.  Clinic address given with instructions to check in on 1st floor [facility] prior to coming to [address].

## 2025-04-23 ENCOUNTER — OFFICE VISIT (OUTPATIENT)
Dept: GASTROENTEROLOGY | Facility: CLINIC | Age: 75
End: 2025-04-23
Payer: MEDICARE

## 2025-04-23 VITALS
HEART RATE: 56 BPM | BODY MASS INDEX: 22.62 KG/M2 | DIASTOLIC BLOOD PRESSURE: 78 MMHG | HEIGHT: 62 IN | SYSTOLIC BLOOD PRESSURE: 143 MMHG | WEIGHT: 122.94 LBS

## 2025-04-23 DIAGNOSIS — K63.9 NONSTEROIDAL ANTI-INFLAMMATORY DRUG (NSAID) INDUCED ENTEROPATHY: Primary | ICD-10-CM

## 2025-04-23 DIAGNOSIS — T39.395A NONSTEROIDAL ANTI-INFLAMMATORY DRUG (NSAID) INDUCED ENTEROPATHY: Primary | ICD-10-CM

## 2025-04-23 PROCEDURE — 99214 OFFICE O/P EST MOD 30 MIN: CPT | Mod: PBBFAC,PN | Performed by: INTERNAL MEDICINE

## 2025-04-23 PROCEDURE — 99999 PR PBB SHADOW E&M-EST. PATIENT-LVL IV: CPT | Mod: PBBFAC,,, | Performed by: INTERNAL MEDICINE

## 2025-04-23 PROCEDURE — 99214 OFFICE O/P EST MOD 30 MIN: CPT | Mod: S$PBB,,, | Performed by: INTERNAL MEDICINE

## 2025-04-23 RX ORDER — SODIUM, POTASSIUM,MAG SULFATES 17.5-3.13G
1 SOLUTION, RECONSTITUTED, ORAL ORAL DAILY
Qty: 1 KIT | Refills: 0 | Status: SHIPPED | OUTPATIENT
Start: 2025-04-23 | End: 2025-04-25

## 2025-04-23 NOTE — PROGRESS NOTES
"[facility]    CC: anemia    HPI 75 y.o. 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 Her NSAID use was daily for years in the form of Uribel tabs that she was taking for interstitial cystitis; also Excedrin for migraines. Diagnosed with NSAID enteropathy in 7/2022 on Upper DBE with noted small bowel diaphragm stenosing lesions.     She previously completed misoprostol course x2 and stopped NSAIDS. She still reports no NSAID use. She has been on iron supplementation therapy, and has previously received iron infusion approximately twice yearly, most recently in April and September 2024. She denies any symptoms of anemia including new onset fatigue, shortness of breath, chest pain. She has not required any pRBC transfusions. No evidence of GI bleeding per patient. She is accompanied by her .    She has received two 8-week courses of Misoprostol.   -Course 1 started 7/2022 (hgb 12.1 in 7/2022 prior to therapy, and 12.6 in 10/2022).  -Course 2 started 10/2023 (hgb 10.6 in 10/2023 prior to course, and 12.4 in 12/2023)    Past Medical History  Basal cell carcinoma  Bicuspid aortic valve  HLD  HTN  Interstitial cystitis previously on chronic NSAIDs  Osteopenia  SHYANN    Labs  Hemoglobin 10.6 (4/2025)  Ferritin 11.6 (4/7/2025)    Physical Examination  BP (!) 143/78 (BP Location: Left arm, Patient Position: Sitting)   Pulse (!) 56   Ht 5' 2" (1.575 m)   Wt 55.7 kg (122 lb 14.5 oz)   BMI 22.48 kg/m²   General appearance: alert, cooperative, no distress  Lungs: clear to auscultation bilaterally, no dullness to percussion bilaterally  Heart: regular rate and rhythm without rub; no displacement of the PMI   Abdomen: soft, non-tender; bowel sounds normoactive; no organomegaly    Endoscopy  Upper DBE 7/2022 -- Multiple diaphragm- like " stenoses in the mid ileum typical for NSAID enteropathy - biopsies performed; the most proximal stenosis was marked with an Ink tattoo     Assessment:   75 y.o. female presenting for follow up of anemia 2/2 chronic GI blood loss from NSAID enteropathy. Upper DBE with noted small bowel diaphragm stenosing lesions.  She previously completed misoprostol course x2 and stopped NSAIDS. Course 1 started 7/2022 (hgb 12.1 > 12.6). Course 2 started 10/2023 (hgb 10.6 > 12.4). On oral iron, parenteral 2x per year (last dose September 2024), arranging next dose per Hematology at last visit.  She is doing well/ asymptomatic and iron and hemoglobin stable (10.6 hemoglobin with baseline 11.5-12).     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 Clinical picture is overall suspicious for Chronic Stenosing Enteritis which is a rare disorder of prostaglandin deficiency that was likely exacerbated in the past from prior NSAID use. Plan for lower DBE for extensive biopsy (including separate jar [facility]in [facility]) and to gregory distal extent of stenosing segment in case that surgery is required in the future. She exhibited a response from misoprostol which we will likely continue as a daily therapy following endoscopic evaluation.    Tara ISSA, et al Chronic Stenosing Enteritis: A Variant of Chronic Non-specific Stenosing Ulceration (CNSU) that Is Distinct from Crohn's Disease. Dig Dis Sci. 2025 Apr; PMID: [id number]    Plan:  -Lower DBE 5/27 with biopsy and tattoo of distal stenosing extent  -Suprep  -Continue iron supplementation, parenteral iron to be arranged by hematology  -Continue to avoid NSAIDs     Héctor Henriquez MD  [facility]ospit[facility]   Richard Parish MD   [facility]

## 2025-04-23 NOTE — PATIENT INSTRUCTIONS
SUPREP Instructions     [facility] [address]  Clinic Office [phone]  Endoscopy Lab [phone]     You are scheduled for a Lower DBE with Dr. Parish  on Tuesday, May 27, 2025 at [facility] in [place].    Check in at the Hospital -1st floor, Information desk.   Call [phone] to reschedule.     An adult friend/family member must come with you to drive you home.  You cannot drive, take a taxi, Uber/Lyft or bus to leave the Endoscopy Center alone.  If you do not have someone to drive you home, your test will be cancelled.      Please follow the directions of your doctor if you take any pills that thin your blood. If you take these meds: Aggrenox, Brilinta, Effient, Eliquis, Lovenox, Plavix, Pletal, Pradaxa, Ticilid, Xarelto or Coumadin, let the doctor's office know.     Please hold any GLP-1 medications prior to the procedure: Dulaglutide Trulicity(hold week prior), Exenatide Byetta (hold the morning of procedure), Semaglutide Ozempic (hold week prior), Liraglutide Victoza, Saxenda(hold week prior), Lixisenatide Adlyxin (hold the morning of procedure), Semaglutide Rybelsus (hold the morning of procedure), Tirzepatide Mounjaro (hold week prior)      DON'T: On the morning of the test do not take insulin or pills for diabetes.      DO: On the morning of the test, do take any pills for blood pressure, heart, anti-rejection and or seizures with a small sip of water. Bring any inhalers with you.     To have a good prep, you must follow these instructions - please do not use the directions from the pharmacy.     The doctor will send a prescription for the SUPREP.        The Day Before the test:     You can only drink CLEAR LIQUIDS the whole day before your test.  You can't eat any food for the whole day.     You CAN have:  Water, Coffee or decaf coffee (no milk or cream)  Tea  Soft drinks - regular and sugar free  Jello (green or yellow)  Apple Juice, white grape  juice, white cranberry juice  Gatorade, Power Aid, Crystal Light, Amilcar Aid  Lemonade and Limeade  Bouillon, clear soup  Snowball, popsicles  YOU CAN'T DRINK ANYTHING RED, PURPLE ORANGE OR BLUE   YOU CAN'T DRINK ALCOHOL  ONLY DRINK WHAT IS ON THE LIST        At 5 pm the night before your test:     Pour the 1st bottle of SUPREP into the cup provided in the box. Add water to the line on the cup and mix well.  Drink the whole cup and then drink 2 more full cups of water over 1 hour.  This can be easier to drink if it is cold. You can mix it 20 minutes ahead of time and place in the refrigerator before you drink it.  You must drink it within 30-45 minutes of mixing it.  Do NOT pour the drink over ice.  You can drink it with a straw.     The Day of the test - We will call you 2 days before your test to tell you what time to get there.     5 hours before you come to the hospital (this may be in the middle of the night)  Pour the 2nd bottle of SUPREP into the cup provided in the box. Add water to the line on the cup and mix well.  Drink the whole cup and then drink 2 more full cups of water over 1 hour.  It might be easier to drink if it is cold. You can mix it 20 minutes ahead of time and place in the refrigerator before you drink it.  You must drink it within 30-45 minutes of mixing it.  Do NOT pour the drink over ice.  You can drink it with a straw.     YOU CAN'T EAT OR DRINK ANYTHING ELSE ONCE YOU FINISH THE PREP     Leave all valuables and jewelry at home. You will be at the hospital for 2-4 hours.     Call the Endoscopy department at [phone] with any questions about your procedure.

## 2025-05-22 ENCOUNTER — TELEPHONE (OUTPATIENT)
Dept: GASTROENTEROLOGY | Facility: CLINIC | Age: 75
End: 2025-05-22
Payer: MEDICARE

## 2025-05-22 NOTE — TELEPHONE ENCOUNTER
Patient given  arrival time @ 10am on Tuesday, May 27, 2025 to [facility] 1st floor Admit.        Prep instructions reviewed: the day before the procedure, follow a clear liquid diet all day, then start the first 1/2 of prep at 5pm and take 2nd 1/2 of prep @ 5am.  Pt must be completely NPO when prep completed.              Medications: Do not take Insulin or oral diabetic medications the day of the procedure.  Take as prescribed: heart, seizure and blood pressure medication in the morning with a sip of water (less than an ounce).  Take any breathing medications and bring inhalers to hospital with you Leave all valuables and jewelry at home.      Wear comfortable clothes to procedure to change into hospital gown You cannot drive for 24 hours after your procedure because you will receive sedation for your procedure to make you comfortable.  A ride must be provided at discharge.      Pt denies taking glp-1 meds.

## 2025-05-23 ENCOUNTER — TELEPHONE (OUTPATIENT)
Dept: ENDOSCOPY | Facility: HOSPITAL | Age: 75
End: 2025-05-23
Payer: MEDICARE

## 2025-05-23 NOTE — TELEPHONE ENCOUNTER
Spoke to patient via phone call. Patient understood arrival time of 1000 and procedure prep. Patient stated all procedure prep information was given to her by Lillian. Patient had no further questions at this time.

## 2025-05-27 ENCOUNTER — ANESTHESIA (OUTPATIENT)
Dept: ENDOSCOPY | Facility: HOSPITAL | Age: 75
End: 2025-05-27
Payer: MEDICARE

## 2025-05-27 ENCOUNTER — HOSPITAL ENCOUNTER (OUTPATIENT)
Facility: HOSPITAL | Age: 75
Discharge: HOME | End: 2025-05-27
Attending: INTERNAL MEDICINE | Admitting: INTERNAL MEDICINE
Payer: MEDICARE

## 2025-05-27 ENCOUNTER — ANESTHESIA EVENT (OUTPATIENT)
Dept: ENDOSCOPY | Facility: HOSPITAL | Age: 75
End: 2025-05-27
Payer: MEDICARE

## 2025-05-27 VITALS
WEIGHT: 120 LBS | RESPIRATION RATE: 16 BRPM | HEIGHT: 62 IN | DIASTOLIC BLOOD PRESSURE: 73 MMHG | BODY MASS INDEX: 22.08 KG/M2 | TEMPERATURE: 98 F | HEART RATE: 60 BPM | SYSTOLIC BLOOD PRESSURE: 138 MMHG | OXYGEN SATURATION: 97 %

## 2025-05-27 DIAGNOSIS — T39.395A NONSTEROIDAL ANTI-INFLAMMATORY DRUG (NSAID) INDUCED ENTEROPATHY: ICD-10-CM

## 2025-05-27 DIAGNOSIS — D50.9 IDA (IRON DEFICIENCY ANEMIA): ICD-10-CM

## 2025-05-27 DIAGNOSIS — K63.9 NONSTEROIDAL ANTI-INFLAMMATORY DRUG (NSAID) INDUCED ENTEROPATHY: ICD-10-CM

## 2025-05-27 DIAGNOSIS — D50.0 ANEMIA DUE TO CHRONIC BLOOD LOSS: Primary | ICD-10-CM

## 2025-05-27 PROCEDURE — 25000003 PHARM REV CODE 250: Performed by: INTERNAL MEDICINE

## 2025-05-27 PROCEDURE — 88305 TISSUE EXAM BY PATHOLOGIST: CPT | Mod: TC | Performed by: INTERNAL MEDICINE

## 2025-05-27 PROCEDURE — 37000008 HC ANESTHESIA 1ST 15 MINUTES: Performed by: INTERNAL MEDICINE

## 2025-05-27 PROCEDURE — 44799 UNLISTED PX SMALL INTESTINE: CPT | Performed by: INTERNAL MEDICINE

## 2025-05-27 PROCEDURE — 27201012 HC FORCEPS, HOT/COLD, DISP: Performed by: INTERNAL MEDICINE

## 2025-05-27 PROCEDURE — 63600175 PHARM REV CODE 636 W HCPCS

## 2025-05-27 PROCEDURE — 25000003 PHARM REV CODE 250

## 2025-05-27 PROCEDURE — 27201028 HC NEEDLE, SCLERO: Performed by: INTERNAL MEDICINE

## 2025-05-27 PROCEDURE — 37000009 HC ANESTHESIA EA ADD 15 MINS: Performed by: INTERNAL MEDICINE

## 2025-05-27 PROCEDURE — 27201238 HC BALLOON, OVERTUBE (ANY): Performed by: INTERNAL MEDICINE

## 2025-05-27 RX ORDER — SODIUM CHLORIDE 9 MG/ML
INJECTION, SOLUTION INTRAVENOUS CONTINUOUS
Status: DISCONTINUED | OUTPATIENT
Start: 2025-05-27 | End: 2025-05-27 | Stop reason: HOSPADM

## 2025-05-27 RX ORDER — GLUCAGON 1 MG
KIT INJECTION
Status: DISCONTINUED | OUTPATIENT
Start: 2025-05-27 | End: 2025-05-27

## 2025-05-27 RX ORDER — LIDOCAINE HYDROCHLORIDE 20 MG/ML
INJECTION INTRAVENOUS
Status: DISCONTINUED | OUTPATIENT
Start: 2025-05-27 | End: 2025-05-27

## 2025-05-27 RX ORDER — PROPOFOL 10 MG/ML
VIAL (ML) INTRAVENOUS CONTINUOUS PRN
Status: DISCONTINUED | OUTPATIENT
Start: 2025-05-27 | End: 2025-05-27

## 2025-05-27 RX ORDER — PROPOFOL 10 MG/ML
VIAL (ML) INTRAVENOUS
Status: DISCONTINUED | OUTPATIENT
Start: 2025-05-27 | End: 2025-05-27

## 2025-05-27 RX ORDER — PHENYLEPHRINE HYDROCHLORIDE 10 MG/ML
INJECTION INTRAVENOUS
Status: DISCONTINUED | OUTPATIENT
Start: 2025-05-27 | End: 2025-05-27

## 2025-05-27 RX ORDER — SODIUM CHLORIDE 0.9 % (FLUSH) 0.9 %
10 SYRINGE (ML) INJECTION
Status: DISCONTINUED | OUTPATIENT
Start: 2025-05-27 | End: 2025-05-27 | Stop reason: HOSPADM

## 2025-05-27 RX ORDER — DEXTROMETHORPHAN/PSEUDOEPHED 2.5-7.5/.8
DROPS ORAL
Status: COMPLETED | OUTPATIENT
Start: 2025-05-27 | End: 2025-05-27

## 2025-05-27 RX ADMIN — PHENYLEPHRINE HYDROCHLORIDE 100 MCG: 10 INJECTION INTRAVENOUS at 12:05

## 2025-05-27 RX ADMIN — PHENYLEPHRINE HYDROCHLORIDE 50 MCG: 10 INJECTION INTRAVENOUS at 12:05

## 2025-05-27 RX ADMIN — PROPOFOL 100 MCG/KG/MIN: 10 INJECTION, EMULSION INTRAVENOUS at 12:05

## 2025-05-27 RX ADMIN — GLYCOPYRROLATE 0.2 MG: 0.2 INJECTION, SOLUTION INTRAMUSCULAR; INTRAVITREAL at 12:05

## 2025-05-27 RX ADMIN — LIDOCAINE HYDROCHLORIDE 50 MG: 20 INJECTION, SOLUTION INTRAVENOUS at 12:05

## 2025-05-27 RX ADMIN — PROPOFOL 50 MG: 10 INJECTION, EMULSION INTRAVENOUS at 12:05

## 2025-05-27 RX ADMIN — SODIUM CHLORIDE: 0.9 INJECTION, SOLUTION INTRAVENOUS at 12:05

## 2025-05-27 RX ADMIN — GLUCAGON HYDROCHLORIDE 0.5 MG: KIT at 12:05

## 2025-05-27 NOTE — ANESTHESIA PREPROCEDURE EVALUATION
[facility]  Anesthesia Pre-Operative Evaluation         Patient Name: Kim Robles  YOB: 1950  MRN: [id number]    SUBJECTIVE:     Pre-operative evaluation for Procedure(s) (LRB):  ENTEROSCOPY, DISTAL (N/A)     2025    Kim Robles is a 75 y.o. female w/ a significant PMHx of .    Patient now presents for the above procedure(s).      LDA: None documented.       Prev airway: None documented.    Drips: None documented.   0.9% NaCl   Intravenous Continuous           Problem List[1]    Review of patient's allergies indicates:   Allergen Reactions    Epinephrine        Current Inpatient Medications:      Medications Ordered Prior to Encounter[2]    Past Surgical History:   Procedure Laterality Date     SECTION      x2    ENDOSCOPY OF PROXIMAL SMALL INTESTINE N/A 2022    Procedure: Long upper DBE with general anesthesia;  Surgeon: Richard Parish MD;  Location: [facility];  Service: Endoscopy;  Laterality: N/A;    JOINT REPLACEMENT      THYROIDECTOMY, PARTIAL         Social History[3]    OBJECTIVE:     Vital Signs Range (Last 24H):         Significant Labs:  Lab Results   Component Value Date    WBC 6.08 2022    HGB 12.1 2022    HCT 36.8 (L) 2022     2022    ALT 16 2024    AST 23 2024     (L) 2024    K 4.5 2024     2022    CREATININE 0.72 2024    BUN 10.9 2024    CO2 26 2024    HGBA1C 5.0 2024       Diagnostic Studies: No relevant studies.    EKG:   No results found for this or any previous visit.    2D ECHO:  TTE:  No results found for this or any previous visit.    ARLETTE:  No results found for this or any previous visit.    ASSESSMENT/PLAN:           Pre-op Assessment    I have reviewed the Patient Summary Reports.    I have reviewed the NPO Status.   I have reviewed the Medications.     Review of Systems  Anesthesia Hx:   Neg history of prior surgery.           Denies Family Hx of Anesthesia complications.    Denies Personal Hx of Anesthesia complications.                    Hematology/Oncology:  Hematology Normal   Oncology Normal                                   EENT/Dental:  EENT/Dental Normal           Cardiovascular:     Hypertension                                          Pulmonary:  Pulmonary Normal                       Renal/:  Renal/ Normal                 Hepatic/GI:  Hepatic/GI Normal                    Musculoskeletal:  Musculoskeletal Normal                Neurological:  Neurology Normal                                      Endocrine:  Endocrine Normal            Dermatological:  Skin Normal    Psych:  Psychiatric Normal                    Physical Exam  General: Well nourished    Airway:  Mallampati: II   TM Distance: Normal  Neck ROM: Normal ROM    Dental:  Intact        Anesthesia Plan  Type of Anesthesia, risks & benefits discussed:    Anesthesia Type: Gen Natural Airway  Intra-op Monitoring Plan: Standard ASA Monitors  Induction:  IV  Informed Consent: Patient consented to blood products? No  ASA Score: 2  Day of Surgery Review of History & Physical: H&P Update referred to the surgeon/provider.    Ready For Surgery From Anesthesia Perspective.     .           [1]   Patient Active Problem List  Diagnosis    Interstitial cystitis    Incomplete bladder emptying    Recurrent UTI    Anemia due to chronic blood loss   [2]   No current facility-administered medications on file prior to encounter.     Current Outpatient Medications on File Prior to Encounter   Medication Sig Dispense Refill    amlodipine (NORVASC) 2.5 MG tablet       ascorbic acid, vitamin C, (VITAMIN C) 500 MG tablet Take 500 mg by mouth once daily.      BOOSTRIX TDAP 2.5-8-5 Lf-mcg-Lf/0.5mL Syrg injection       calcium carbonate (OS-EV) 600 mg (1,500 mg) Tab Take 600 mg by mouth once daily.      cranberry 400 mg Cap Take by mouth.      levocetirizine (XYZAL) 5 MG tablet Take 5 mg by  mouth once daily.      LEVOXYL 50 mcg tablet Take 50 mcg by mouth once daily.  6    linaCLOtide (LINZESS) 72 mcg Cap capsule Take 72 mcg by mouth before breakfast.      metoprolol succinate (TOPROL-XL) 50 MG 24 hr tablet       multivit-min-FA-lycopen-lutein 0.4 mg-300 mcg- 250 mcg Tab Take 1 tablet by mouth once daily.      omega-3 fatty acids 1,000 mg Cap Take 2 g by mouth.      pentosan polysulfate (ELMIRON) 100 mg Cap Take 1 capsule (100 mg total) by mouth 2 (two) times daily. 180 capsule 3    valsartan (DIOVAN) 160 MG tablet Take 160 mg by mouth 2 (two) times daily.     [3]   Social History  Socioeconomic History    Marital status:    Tobacco Use    Smoking status: Never    Smokeless tobacco: Never   Substance and Sexual Activity    Alcohol use: Yes     Alcohol/week: 3.0 standard drinks of alcohol     Types: 3 Glasses of wine per week    Drug use: No    Sexual activity: Yes     Partners: Male     Social Drivers of Health     Financial Resource Strain: Low Risk  (3/16/2025)    Overall Financial Resource Strain (CARDIA)     Difficulty of Paying Living Expenses: Not hard at all   Food Insecurity: No Food Insecurity (3/16/2025)    Hunger Vital Sign     Worried About Running Out of Food in the Last Year: Never true     Ran Out of Food in the Last Year: Never true   Transportation Needs: No Transportation Needs (3/16/2025)    PRAPARE - Transportation     Lack of Transportation (Medical): No     Lack of Transportation (Non-Medical): No   Physical Activity: Sufficiently Active (3/16/2025)    Exercise Vital Sign     Days of Exercise per Week: 6 days     Minutes of Exercise per Session: 60 min   Stress: No Stress Concern Present (3/16/2025)    [employer] - Occupational Stress Questionnaire     Feeling of Stress : Only a little   Housing Stability: Low Risk  (3/16/2025)    Housing Stability Vital Sign     Unable to Pay for Housing in the Last Year: No     Number of Times Moved in the  Last Year: 0     Homeless in the Last Year: No

## 2025-05-27 NOTE — H&P
[facility]    CC: anemia     HPI 75 y.o. 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 Her NSAID use was daily for years in the form of Uribel tabs that she was taking for interstitial cystitis; also Excedrin for migraines. Diagnosed with NSAID enteropathy in 7/2022 on Upper DBE with noted small bowel diaphragm stenosing lesions.      She previously completed misoprostol course x2 and stopped NSAIDS. She still reports no NSAID use. She has been on iron supplementation therapy, and has previously received iron infusion approximately twice yearly.. She denies any symptoms of anemia including new onset fatigue, shortness of breath, chest pain. She has not required any pRBC transfusions. No evidence of GI bleeding per patient.      She has received two 8-week courses of Misoprostol.   -Course 1 started 7/2022 (hgb 12.1 in 7/2022 prior to therapy, and 12.6 in 10/2022).  -Course 2 started 10/2023 (hgb 10.6 in 10/2023 prior to course, and 12.4 in 12/2023)    Past Medical History  Past Medical History:   Diagnosis Date    Basal cell carcinoma     Hypercholesteremia     Hypertension     IC (interstitial cystitis)     Osteopenia      Physical Examination  There were no vitals taken for this visit.  General appearance: alert, cooperative, no distress  Lungs: clear to auscultation bilaterally, no dullness to percussion bilaterally  Heart: regular rate and rhythm without rub; no displacement of the PMI   Abdomen: soft, non-tender; bowel sounds normoactive; no organomegaly    Endoscopy  Upper DBE 7/2022 -- Multiple diaphragm- like stenoses in the mid ileum typical for NSAID enteropathy - biopsies performed; the most proximal stenosis was marked with an Ink tattoo      Assessment:   75 y.o. female presenting for follow up of anemia 2/2 chronic  GI blood loss from NSAID enteropathy. Upper DBE with noted small bowel diaphragm stenosing lesions.  She previously completed misoprostol course x2 and stopped NSAIDS. Course 1 started 7/2022 (hgb 12.1 > 12.6). Course 2 started 10/2023 (hgb 10.6 > 12.4). On oral iron, parenteral  per Hematology.  She is doing well/ asymptomatic and iron and hemoglobin stable (10.6 hemoglobin with baseline 11.5-12).   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 Clinical picture is overall suspicious for Chronic Stenosing Enteritis     Plan:  - Plan for lower DBE for extensive biopsy (including separate jar for prostaglandin deficiency assay with [facility]) and to gregory distal extent of stenosing segment in case that surgery is required in the future.   - She exhibited a response from misoprostol which we will likely continue as a daily therapy following endoscopic evaluation.    Richard Parish MD   [address] [place] [address], [address]  [place] [address] [phone]

## 2025-05-27 NOTE — PROVATION PATIENT INSTRUCTIONS
Discharge Summary/Instructions after an Endoscopic Procedure  Patient Name: Kim Robles  Patient MRN: [id number]  Patient YOB: 1950  Tuesday, May 27, 2025  Richard Parish MD  Dear patient,  As a result of recent federal legislation (The Federal Cures Act), you may   receive lab or pathology results from your procedure in your [facility]   account before your physician is able to contact you. Your physician or   their representative will relay the results to you with their   recommendations at their soonest availability.  Thank you,  Your health is very important to us during the Covid Crisis. Following your   procedure today, you will receive a daily text for 2 weeks asking about   signs or symptoms of Covid 19.  Please respond to this text when you   receive it so we can follow up and keep you as safe as possible.   RESTRICTIONS:  During your procedure today, you received medications for sedation.  These   medications may affect your judgment, balance and coordination.  Therefore,   for 24 hours, you have the following restrictions:   - DO NOT drive a car, operate machinery, make legal/financial decisions,   sign important papers or drink alcohol.    ACTIVITY:  Today: no heavy lifting, straining or running due to procedural   sedation/anesthesia.  The following day: return to full activity including work.  DIET:  Eat and drink normally unless instructed otherwise.     TREATMENT FOR COMMON SIDE EFFECTS:  - Mild abdominal pain, nausea, belching, bloating or excessive gas:  rest,   eat lightly and use a heating pad.  - Sore Throat: treat with throat lozenges and/or gargle with warm salt   water.  - Because air was used during the procedure, expelling large amounts of air   from your rectum or belching is normal.  - If a bowel prep was taken, you may not have a bowel movement for 1-3 days.    This is normal.  SYMPTOMS TO WATCH FOR AND REPORT TO YOUR PHYSICIAN:  1. Abdominal pain or bloating, other than gas  cramps.  2. Chest pain.  3. Back pain.  4. Signs of infection such as: chills or fever occurring within 24 hours   after the procedure.  5. Rectal bleeding, which would show as bright red, maroon, or black stools.   (A tablespoon of blood from the rectum is not serious, especially if   hemorrhoids are present.)  6. Vomiting.  7. Weakness or dizziness.  GO DIRECTLY TO THE NEAREST EMERGENCY ROOM IF YOU HAVE ANY OF THE FOLLOWING:      Difficulty breathing              Chills and/or fever over 101 F   Persistent vomiting and/or vomiting blood   Severe abdominal pain   Severe chest pain   Black, tarry stools   Bleeding- more than one tablespoon   Any other symptom or condition that you feel may need urgent attention  Your doctor recommends these additional instructions:  If any biopsies were taken, your doctors clinic will contact you in 1 to 2   weeks with any results.  - Proceed with specific testing for CSE/CNSU/CMUSE by pathologic assay and   genetic testing   - Resume misoprostol as 200mcg twice daily on an indefinite basis for now   - Continue iron supplementation including parenteral iron as needed  - Surgical resection of the diseased segment of ileum may be considered in   the future as the disease segment is now marked with Vivienne Ink at the   proximal and distal margins. However, this may not be curative if the   diagnosis is CSE  - Discharge to home  - Resume previous diet and medications  - Condition stable   - The signs and symptoms of potential delayed complications were discussed   with the patient. If signs or symptoms of these complications develop, call   the [facility] at 1 [phone].   - Return to normal activities tomorrow.  Written discharge instructions were   provided to the patient.  For questions, problems or results please call your physician - Richard Parish MD.  EMERGENCY PHONE NUMBER: [phone],  LAB RESULTS: [phone]  IF A COMPLICATION OR EMERGENCY SITUATION  ARISES AND YOU ARE UNABLE TO REACH   YOUR PHYSICIAN - GO DIRECTLY TO THE EMERGENCY ROOM.  MD Richard Lee MD  5/27/2025 1:27:26 PM  This report has been verified and signed electronically.  Dear patient,  As a result of recent federal legislation (The Federal Cures Act), you may   receive lab or pathology results from your procedure in your [facility]   account before your physician is able to contact you. Your physician or   their representative will relay the results to you with their   recommendations at their soonest availability.  Thank you,  [employer]ATION

## 2025-05-27 NOTE — TRANSFER OF CARE
"Anesthesia Transfer of Care Note    Patient: Kim Robles    Procedure(s) Performed: Procedure(s) (LRB):  ENTEROSCOPY, DISTAL (N/A)    Patient location: GI    Anesthesia Type: general    Transport from OR: Transported from OR on room air with adequate spontaneous ventilation    Post pain: adequate analgesia    Post assessment: no apparent anesthetic complications and tolerated procedure well    Post vital signs: stable    Level of consciousness: responds to stimulation, awake and alert    Nausea/Vomiting: no nausea/vomiting    Complications: none    Transfer of care protocol was followed      Last vitals: Visit Vitals  /80 (BP Location: Left arm, Patient Position: Lying)   Pulse (!) 57   Temp 36.6 °C (97.9 °F) (Skin)   Resp 18   Ht 5' 2" (1.575 m)   Wt 54.4 kg (120 lb)   SpO2 97%   Breastfeeding No   BMI 21.95 kg/m²     "

## 2025-05-29 ENCOUNTER — TELEPHONE (OUTPATIENT)
Dept: GASTROENTEROLOGY | Facility: CLINIC | Age: 75
End: 2025-05-29
Payer: MEDICARE

## 2025-05-29 LAB
ESTROGEN SERPL-MCNC: NORMAL PG/ML
INSULIN SERPL-ACNC: NORMAL U[IU]/ML
LAB AP CLINICAL INFORMATION: NORMAL
LAB AP GROSS DESCRIPTION: NORMAL
LAB AP PERFORMING LOCATION(S): NORMAL
LAB AP REPORT FOOTNOTES: NORMAL

## 2025-05-29 NOTE — TELEPHONE ENCOUNTER
----- Message from Melyssa sent at 5/29/2025 11:50 AM CDT -----  Type:  Needs Medical AdviceWho Called: ptWould the patient rather a call back or a response via [facility]? Call Best Call Back Number: [phone] Information: pt requesting a call back to discuss new medication misoprostol

## 2025-05-30 NOTE — ANESTHESIA POSTPROCEDURE EVALUATION
Anesthesia Post Evaluation    Patient: Kim Robles    Procedure(s) Performed: Procedure(s) (LRB):  ENTEROSCOPY, DISTAL (N/A)    Final Anesthesia Type: general      Patient location during evaluation: PACU  Patient participation: Yes- Able to Participate  Level of consciousness: awake  Post-procedure vital signs: reviewed and stable  Pain management: adequate  Airway patency: patent    PONV status at discharge: No PONV  Anesthetic complications: no      Cardiovascular status: blood pressure returned to baseline  Respiratory status: unassisted  Hydration status: euvolemic  Follow-up not needed.              Vitals Value Taken Time   /73 05/27/25 13:20   Temp 36.9 °C (98.4 °F) 05/27/25 12:50   Pulse 60 05/27/25 13:20   Resp 16 05/27/25 13:20   SpO2 97 % 05/27/25 13:20         No case tracking events are documented in the log.      Pain/Sadie Score: No data recorded

## 2025-06-04 ENCOUNTER — RESULTS FOLLOW-UP (OUTPATIENT)
Dept: GASTROENTEROLOGY | Facility: HOSPITAL | Age: 75
End: 2025-06-04